# Patient Record
Sex: FEMALE | Race: WHITE
[De-identification: names, ages, dates, MRNs, and addresses within clinical notes are randomized per-mention and may not be internally consistent; named-entity substitution may affect disease eponyms.]

---

## 2024-04-11 ENCOUNTER — HOSPITAL ENCOUNTER
Dept: HOSPITAL 101 - RAD | Age: 54
Discharge: HOME | End: 2024-04-11
Payer: MEDICAID

## 2024-04-11 DIAGNOSIS — M48.061: Primary | ICD-10-CM

## 2024-04-11 DIAGNOSIS — M51.36: ICD-10-CM

## 2024-04-11 PROCEDURE — 72100 X-RAY EXAM L-S SPINE 2/3 VWS: CPT

## 2024-08-10 ENCOUNTER — APPOINTMENT (OUTPATIENT)
Dept: RADIOLOGY | Facility: HOSPITAL | Age: 54
End: 2024-08-10
Payer: MEDICAID

## 2024-08-10 ENCOUNTER — HOSPITAL ENCOUNTER (OUTPATIENT)
Facility: HOSPITAL | Age: 54
Setting detail: OBSERVATION
End: 2024-08-10
Attending: STUDENT IN AN ORGANIZED HEALTH CARE EDUCATION/TRAINING PROGRAM | Admitting: STUDENT IN AN ORGANIZED HEALTH CARE EDUCATION/TRAINING PROGRAM
Payer: MEDICAID

## 2024-08-10 ENCOUNTER — APPOINTMENT (OUTPATIENT)
Dept: CARDIOLOGY | Facility: HOSPITAL | Age: 54
End: 2024-08-10
Payer: MEDICAID

## 2024-08-10 DIAGNOSIS — I50.9 CONGESTIVE HEART FAILURE, UNSPECIFIED HF CHRONICITY, UNSPECIFIED HEART FAILURE TYPE: ICD-10-CM

## 2024-08-10 DIAGNOSIS — I25.118 CORONARY ARTERY DISEASE OF NATIVE ARTERY OF NATIVE HEART WITH STABLE ANGINA PECTORIS: ICD-10-CM

## 2024-08-10 DIAGNOSIS — I47.10 PAROXYSMAL SVT (SUPRAVENTRICULAR TACHYCARDIA) (CMS-HCC): ICD-10-CM

## 2024-08-10 DIAGNOSIS — E78.2 MIXED HYPERLIPIDEMIA: ICD-10-CM

## 2024-08-10 DIAGNOSIS — Z98.61 POST PTCA: ICD-10-CM

## 2024-08-10 DIAGNOSIS — I25.110 ATHEROSCLEROTIC HEART DISEASE OF NATIVE CORONARY ARTERY WITH UNSTABLE ANGINA PECTORIS: ICD-10-CM

## 2024-08-10 DIAGNOSIS — R07.9 CHEST PAIN, UNSPECIFIED TYPE: Primary | ICD-10-CM

## 2024-08-10 PROBLEM — M50.20 CERVICAL DISCOGENIC PAIN SYNDROME: Status: ACTIVE | Noted: 2024-08-10

## 2024-08-10 PROBLEM — M48.061 DEGENERATIVE LUMBAR SPINAL STENOSIS: Status: ACTIVE | Noted: 2024-08-10

## 2024-08-10 PROBLEM — M50.30 CERVICAL DISCOGENIC PAIN SYNDROME: Status: ACTIVE | Noted: 2024-08-10

## 2024-08-10 PROBLEM — J30.2 SEASONAL ALLERGIC RHINITIS: Status: ACTIVE | Noted: 2019-08-06

## 2024-08-10 PROBLEM — F41.9 ANXIETY: Status: ACTIVE | Noted: 2024-08-10

## 2024-08-10 PROBLEM — K21.9 GASTROESOPHAGEAL REFLUX DISEASE: Status: ACTIVE | Noted: 2024-08-10

## 2024-08-10 LAB
ALBUMIN SERPL BCP-MCNC: 3.7 G/DL (ref 3.4–5)
ALP SERPL-CCNC: 60 U/L (ref 33–110)
ALT SERPL W P-5'-P-CCNC: 10 U/L (ref 7–45)
ANION GAP SERPL CALC-SCNC: 12 MMOL/L (ref 10–20)
AST SERPL W P-5'-P-CCNC: 11 U/L (ref 9–39)
BASOPHILS # BLD AUTO: 0.04 X10*3/UL (ref 0–0.1)
BASOPHILS NFR BLD AUTO: 0.5 %
BILIRUB SERPL-MCNC: 0.3 MG/DL (ref 0–1.2)
BNP SERPL-MCNC: 20 PG/ML (ref 0–99)
BUN SERPL-MCNC: 12 MG/DL (ref 6–23)
CALCIUM SERPL-MCNC: 8.6 MG/DL (ref 8.6–10.3)
CARDIAC TROPONIN I PNL SERPL HS: 5 NG/L (ref 0–13)
CARDIAC TROPONIN I PNL SERPL HS: 6 NG/L (ref 0–13)
CHLORIDE SERPL-SCNC: 101 MMOL/L (ref 98–107)
CO2 SERPL-SCNC: 24 MMOL/L (ref 21–32)
CREAT SERPL-MCNC: 0.62 MG/DL (ref 0.5–1.05)
D DIMER PPP FEU-MCNC: 532 NG/ML FEU
EGFRCR SERPLBLD CKD-EPI 2021: >90 ML/MIN/1.73M*2
EOSINOPHIL # BLD AUTO: 0.19 X10*3/UL (ref 0–0.7)
EOSINOPHIL NFR BLD AUTO: 2.2 %
ERYTHROCYTE [DISTWIDTH] IN BLOOD BY AUTOMATED COUNT: 13.3 % (ref 11.5–14.5)
GLUCOSE SERPL-MCNC: 133 MG/DL (ref 74–99)
HCT VFR BLD AUTO: 34.6 % (ref 36–46)
HGB BLD-MCNC: 11.4 G/DL (ref 12–16)
IMM GRANULOCYTES # BLD AUTO: 0.02 X10*3/UL (ref 0–0.7)
IMM GRANULOCYTES NFR BLD AUTO: 0.2 % (ref 0–0.9)
LYMPHOCYTES # BLD AUTO: 3.48 X10*3/UL (ref 1.2–4.8)
LYMPHOCYTES NFR BLD AUTO: 39.7 %
MAGNESIUM SERPL-MCNC: 1.83 MG/DL (ref 1.6–2.4)
MCH RBC QN AUTO: 32 PG (ref 26–34)
MCHC RBC AUTO-ENTMCNC: 32.9 G/DL (ref 32–36)
MCV RBC AUTO: 97 FL (ref 80–100)
MONOCYTES # BLD AUTO: 0.37 X10*3/UL (ref 0.1–1)
MONOCYTES NFR BLD AUTO: 4.2 %
NEUTROPHILS # BLD AUTO: 4.67 X10*3/UL (ref 1.2–7.7)
NEUTROPHILS NFR BLD AUTO: 53.2 %
NRBC BLD-RTO: 0 /100 WBCS (ref 0–0)
PLATELET # BLD AUTO: 249 X10*3/UL (ref 150–450)
POTASSIUM SERPL-SCNC: 3.6 MMOL/L (ref 3.5–5.3)
PROT SERPL-MCNC: 6.4 G/DL (ref 6.4–8.2)
RBC # BLD AUTO: 3.56 X10*6/UL (ref 4–5.2)
SODIUM SERPL-SCNC: 133 MMOL/L (ref 136–145)
WBC # BLD AUTO: 8.8 X10*3/UL (ref 4.4–11.3)

## 2024-08-10 PROCEDURE — 96374 THER/PROPH/DIAG INJ IV PUSH: CPT

## 2024-08-10 PROCEDURE — 93005 ELECTROCARDIOGRAM TRACING: CPT

## 2024-08-10 PROCEDURE — 99223 1ST HOSP IP/OBS HIGH 75: CPT | Performed by: INTERNAL MEDICINE

## 2024-08-10 PROCEDURE — 84484 ASSAY OF TROPONIN QUANT: CPT | Performed by: NURSE PRACTITIONER

## 2024-08-10 PROCEDURE — 99285 EMERGENCY DEPT VISIT HI MDM: CPT | Mod: 25

## 2024-08-10 PROCEDURE — 2500000001 HC RX 250 WO HCPCS SELF ADMINISTERED DRUGS (ALT 637 FOR MEDICARE OP): Performed by: NURSE PRACTITIONER

## 2024-08-10 PROCEDURE — 83735 ASSAY OF MAGNESIUM: CPT | Performed by: NURSE PRACTITIONER

## 2024-08-10 PROCEDURE — 85379 FIBRIN DEGRADATION QUANT: CPT | Performed by: INTERNAL MEDICINE

## 2024-08-10 PROCEDURE — 2500000004 HC RX 250 GENERAL PHARMACY W/ HCPCS (ALT 636 FOR OP/ED): Performed by: INTERNAL MEDICINE

## 2024-08-10 PROCEDURE — 80053 COMPREHEN METABOLIC PANEL: CPT | Performed by: NURSE PRACTITIONER

## 2024-08-10 PROCEDURE — 2500000001 HC RX 250 WO HCPCS SELF ADMINISTERED DRUGS (ALT 637 FOR MEDICARE OP): Performed by: INTERNAL MEDICINE

## 2024-08-10 PROCEDURE — 71045 X-RAY EXAM CHEST 1 VIEW: CPT

## 2024-08-10 PROCEDURE — 83880 ASSAY OF NATRIURETIC PEPTIDE: CPT | Performed by: NURSE PRACTITIONER

## 2024-08-10 PROCEDURE — 36415 COLL VENOUS BLD VENIPUNCTURE: CPT | Performed by: NURSE PRACTITIONER

## 2024-08-10 PROCEDURE — G0378 HOSPITAL OBSERVATION PER HR: HCPCS

## 2024-08-10 PROCEDURE — 2500000004 HC RX 250 GENERAL PHARMACY W/ HCPCS (ALT 636 FOR OP/ED): Performed by: NURSE PRACTITIONER

## 2024-08-10 PROCEDURE — 83036 HEMOGLOBIN GLYCOSYLATED A1C: CPT | Mod: ELYLAB | Performed by: INTERNAL MEDICINE

## 2024-08-10 PROCEDURE — 36415 COLL VENOUS BLD VENIPUNCTURE: CPT | Performed by: INTERNAL MEDICINE

## 2024-08-10 PROCEDURE — 71045 X-RAY EXAM CHEST 1 VIEW: CPT | Performed by: RADIOLOGY

## 2024-08-10 PROCEDURE — 2500000005 HC RX 250 GENERAL PHARMACY W/O HCPCS: Performed by: INTERNAL MEDICINE

## 2024-08-10 PROCEDURE — 85025 COMPLETE CBC W/AUTO DIFF WBC: CPT | Performed by: NURSE PRACTITIONER

## 2024-08-10 RX ORDER — FENTANYL CITRATE 50 UG/ML
50 INJECTION, SOLUTION INTRAMUSCULAR; INTRAVENOUS ONCE
Status: COMPLETED | OUTPATIENT
Start: 2024-08-10 | End: 2024-08-10

## 2024-08-10 RX ORDER — ONDANSETRON HYDROCHLORIDE 2 MG/ML
4 INJECTION, SOLUTION INTRAVENOUS EVERY 8 HOURS PRN
Status: DISCONTINUED | OUTPATIENT
Start: 2024-08-10 | End: 2024-08-13 | Stop reason: HOSPADM

## 2024-08-10 RX ORDER — TALC
3 POWDER (GRAM) TOPICAL NIGHTLY PRN
Status: DISCONTINUED | OUTPATIENT
Start: 2024-08-10 | End: 2024-08-13 | Stop reason: HOSPADM

## 2024-08-10 RX ORDER — PANTOPRAZOLE SODIUM 40 MG/10ML
40 INJECTION, POWDER, LYOPHILIZED, FOR SOLUTION INTRAVENOUS
Status: DISCONTINUED | OUTPATIENT
Start: 2024-08-11 | End: 2024-08-12

## 2024-08-10 RX ORDER — ASPIRIN 81 MG/1
81 TABLET ORAL DAILY
COMMUNITY

## 2024-08-10 RX ORDER — METOPROLOL TARTRATE 25 MG/1
12.5 TABLET, FILM COATED ORAL 2 TIMES DAILY
Status: DISCONTINUED | OUTPATIENT
Start: 2024-08-10 | End: 2024-08-12

## 2024-08-10 RX ORDER — NITROGLYCERIN 0.4 MG/1
0.4 TABLET SUBLINGUAL EVERY 5 MIN PRN
Status: DISCONTINUED | OUTPATIENT
Start: 2024-08-10 | End: 2024-08-13 | Stop reason: HOSPADM

## 2024-08-10 RX ORDER — MORPHINE SULFATE 2 MG/ML
2 INJECTION, SOLUTION INTRAMUSCULAR; INTRAVENOUS
Status: DISCONTINUED | OUTPATIENT
Start: 2024-08-10 | End: 2024-08-13 | Stop reason: HOSPADM

## 2024-08-10 RX ORDER — LANOLIN ALCOHOL/MO/W.PET/CERES
400 CREAM (GRAM) TOPICAL 2 TIMES DAILY
Status: DISCONTINUED | OUTPATIENT
Start: 2024-08-10 | End: 2024-08-13 | Stop reason: HOSPADM

## 2024-08-10 RX ORDER — ACETAMINOPHEN 650 MG/1
650 SUPPOSITORY RECTAL EVERY 4 HOURS PRN
Status: DISCONTINUED | OUTPATIENT
Start: 2024-08-10 | End: 2024-08-13 | Stop reason: HOSPADM

## 2024-08-10 RX ORDER — SODIUM CHLORIDE, SODIUM LACTATE, POTASSIUM CHLORIDE, CALCIUM CHLORIDE 600; 310; 30; 20 MG/100ML; MG/100ML; MG/100ML; MG/100ML
75 INJECTION, SOLUTION INTRAVENOUS CONTINUOUS
Status: DISCONTINUED | OUTPATIENT
Start: 2024-08-10 | End: 2024-08-11

## 2024-08-10 RX ORDER — ACETAMINOPHEN 160 MG/5ML
650 SOLUTION ORAL EVERY 4 HOURS PRN
Status: DISCONTINUED | OUTPATIENT
Start: 2024-08-10 | End: 2024-08-13 | Stop reason: HOSPADM

## 2024-08-10 RX ORDER — POLYETHYLENE GLYCOL 3350 17 G/17G
17 POWDER, FOR SOLUTION ORAL DAILY PRN
Status: DISCONTINUED | OUTPATIENT
Start: 2024-08-10 | End: 2024-08-13 | Stop reason: HOSPADM

## 2024-08-10 RX ORDER — PANTOPRAZOLE SODIUM 40 MG/1
40 TABLET, DELAYED RELEASE ORAL
Status: DISCONTINUED | OUTPATIENT
Start: 2024-08-11 | End: 2024-08-12

## 2024-08-10 RX ORDER — NAPROXEN SODIUM 220 MG/1
243 TABLET, FILM COATED ORAL ONCE
Status: COMPLETED | OUTPATIENT
Start: 2024-08-10 | End: 2024-08-10

## 2024-08-10 RX ORDER — ATORVASTATIN CALCIUM 80 MG/1
80 TABLET, FILM COATED ORAL NIGHTLY
Status: DISCONTINUED | OUTPATIENT
Start: 2024-08-10 | End: 2024-08-13 | Stop reason: HOSPADM

## 2024-08-10 RX ORDER — ONDANSETRON 4 MG/1
4 TABLET, FILM COATED ORAL EVERY 8 HOURS PRN
Status: DISCONTINUED | OUTPATIENT
Start: 2024-08-10 | End: 2024-08-13 | Stop reason: HOSPADM

## 2024-08-10 RX ORDER — ACETAMINOPHEN 325 MG/1
650 TABLET ORAL EVERY 4 HOURS PRN
Status: DISCONTINUED | OUTPATIENT
Start: 2024-08-10 | End: 2024-08-13 | Stop reason: HOSPADM

## 2024-08-10 RX ORDER — ASPIRIN 81 MG/1
81 TABLET ORAL DAILY
Status: DISCONTINUED | OUTPATIENT
Start: 2024-08-11 | End: 2024-08-13 | Stop reason: HOSPADM

## 2024-08-10 RX ORDER — ATORVASTATIN CALCIUM 80 MG/1
80 TABLET, FILM COATED ORAL DAILY
COMMUNITY

## 2024-08-10 RX ORDER — NITROGLYCERIN 0.4 MG/1
0.4 TABLET SUBLINGUAL EVERY 5 MIN PRN
COMMUNITY

## 2024-08-10 RX ADMIN — METOPROLOL TARTRATE 12.5 MG: 25 TABLET, FILM COATED ORAL at 22:22

## 2024-08-10 RX ADMIN — NITROGLYCERIN 0.4 MG: 0.4 TABLET SUBLINGUAL at 21:00

## 2024-08-10 RX ADMIN — MAGNESIUM OXIDE 400 MG (241.3 MG MAGNESIUM) TABLET 400 MG: TABLET at 22:22

## 2024-08-10 RX ADMIN — MORPHINE SULFATE 2 MG: 2 INJECTION, SOLUTION INTRAMUSCULAR; INTRAVENOUS at 22:22

## 2024-08-10 RX ADMIN — FENTANYL CITRATE 50 MCG: 50 INJECTION, SOLUTION INTRAMUSCULAR; INTRAVENOUS at 18:01

## 2024-08-10 RX ADMIN — ATORVASTATIN CALCIUM 80 MG: 80 TABLET, FILM COATED ORAL at 22:22

## 2024-08-10 RX ADMIN — SODIUM CHLORIDE, POTASSIUM CHLORIDE, SODIUM LACTATE AND CALCIUM CHLORIDE 75 ML/HR: 600; 310; 30; 20 INJECTION, SOLUTION INTRAVENOUS at 20:57

## 2024-08-10 RX ADMIN — ONDANSETRON 4 MG: 4 TABLET, FILM COATED ORAL at 22:22

## 2024-08-10 RX ADMIN — ASPIRIN 243 MG: 81 TABLET, CHEWABLE ORAL at 16:28

## 2024-08-10 SDOH — SOCIAL STABILITY: SOCIAL INSECURITY: DO YOU FEEL ANYONE HAS EXPLOITED OR TAKEN ADVANTAGE OF YOU FINANCIALLY OR OF YOUR PERSONAL PROPERTY?: NO

## 2024-08-10 SDOH — SOCIAL STABILITY: SOCIAL INSECURITY: ABUSE: ADULT

## 2024-08-10 SDOH — SOCIAL STABILITY: SOCIAL INSECURITY: HAVE YOU HAD ANY THOUGHTS OF HARMING ANYONE ELSE?: NO

## 2024-08-10 SDOH — SOCIAL STABILITY: SOCIAL INSECURITY: DOES ANYONE TRY TO KEEP YOU FROM HAVING/CONTACTING OTHER FRIENDS OR DOING THINGS OUTSIDE YOUR HOME?: NO

## 2024-08-10 SDOH — SOCIAL STABILITY: SOCIAL INSECURITY: HAVE YOU HAD THOUGHTS OF HARMING ANYONE ELSE?: NO

## 2024-08-10 SDOH — SOCIAL STABILITY: SOCIAL INSECURITY: ARE YOU OR HAVE YOU BEEN THREATENED OR ABUSED PHYSICALLY, EMOTIONALLY, OR SEXUALLY BY ANYONE?: NO

## 2024-08-10 SDOH — SOCIAL STABILITY: SOCIAL INSECURITY: HAS ANYONE EVER THREATENED TO HURT YOUR FAMILY OR YOUR PETS?: NO

## 2024-08-10 SDOH — SOCIAL STABILITY: SOCIAL INSECURITY: ARE THERE ANY APPARENT SIGNS OF INJURIES/BEHAVIORS THAT COULD BE RELATED TO ABUSE/NEGLECT?: NO

## 2024-08-10 SDOH — SOCIAL STABILITY: SOCIAL INSECURITY: DO YOU FEEL UNSAFE GOING BACK TO THE PLACE WHERE YOU ARE LIVING?: NO

## 2024-08-10 SDOH — SOCIAL STABILITY: SOCIAL INSECURITY: WERE YOU ABLE TO COMPLETE ALL THE BEHAVIORAL HEALTH SCREENINGS?: YES

## 2024-08-10 ASSESSMENT — PATIENT HEALTH QUESTIONNAIRE - PHQ9
1. LITTLE INTEREST OR PLEASURE IN DOING THINGS: NOT AT ALL
SUM OF ALL RESPONSES TO PHQ9 QUESTIONS 1 & 2: 0
2. FEELING DOWN, DEPRESSED OR HOPELESS: NOT AT ALL

## 2024-08-10 ASSESSMENT — COGNITIVE AND FUNCTIONAL STATUS - GENERAL
PATIENT BASELINE BEDBOUND: NO
DAILY ACTIVITIY SCORE: 24
MOBILITY SCORE: 24

## 2024-08-10 ASSESSMENT — PAIN DESCRIPTION - PROGRESSION: CLINICAL_PROGRESSION: GRADUALLY IMPROVING

## 2024-08-10 ASSESSMENT — HEART SCORE
TROPONIN: LESS THAN OR EQUAL TO NORMAL LIMIT
HEART SCORE: 4
AGE: 45-64
ECG: NORMAL
HISTORY: MODERATELY SUSPICIOUS
RISK FACTORS: >2 RISK FACTORS OR HX OF ATHEROSCLEROTIC DISEASE

## 2024-08-10 ASSESSMENT — PAIN DESCRIPTION - LOCATION
LOCATION: CHEST

## 2024-08-10 ASSESSMENT — ACTIVITIES OF DAILY LIVING (ADL)
HEARING - RIGHT EAR: FUNCTIONAL
HEARING - LEFT EAR: FUNCTIONAL
DRESSING YOURSELF: INDEPENDENT
ADEQUATE_TO_COMPLETE_ADL: YES
TOILETING: INDEPENDENT
BATHING: INDEPENDENT
FEEDING YOURSELF: INDEPENDENT
JUDGMENT_ADEQUATE_SAFELY_COMPLETE_DAILY_ACTIVITIES: YES
LACK_OF_TRANSPORTATION: NO
PATIENT'S MEMORY ADEQUATE TO SAFELY COMPLETE DAILY ACTIVITIES?: YES
WALKS IN HOME: INDEPENDENT
GROOMING: INDEPENDENT

## 2024-08-10 ASSESSMENT — LIFESTYLE VARIABLES
TOTAL SCORE: 0
AUDIT-C TOTAL SCORE: 0
AUDIT-C TOTAL SCORE: 0
HAVE PEOPLE ANNOYED YOU BY CRITICIZING YOUR DRINKING: NO
HOW OFTEN DO YOU HAVE 6 OR MORE DRINKS ON ONE OCCASION: NEVER
EVER HAD A DRINK FIRST THING IN THE MORNING TO STEADY YOUR NERVES TO GET RID OF A HANGOVER: NO
HOW OFTEN DO YOU HAVE A DRINK CONTAINING ALCOHOL: NEVER
HOW MANY STANDARD DRINKS CONTAINING ALCOHOL DO YOU HAVE ON A TYPICAL DAY: PATIENT DOES NOT DRINK
HAVE YOU EVER FELT YOU SHOULD CUT DOWN ON YOUR DRINKING: NO
EVER FELT BAD OR GUILTY ABOUT YOUR DRINKING: NO
SKIP TO QUESTIONS 9-10: 1

## 2024-08-10 ASSESSMENT — PAIN SCALES - GENERAL
PAINLEVEL_OUTOF10: 8
PAINLEVEL_OUTOF10: 8
PAINLEVEL_OUTOF10: 4
PAINLEVEL_OUTOF10: 7

## 2024-08-10 ASSESSMENT — PAIN - FUNCTIONAL ASSESSMENT
PAIN_FUNCTIONAL_ASSESSMENT: 0-10
PAIN_FUNCTIONAL_ASSESSMENT: 0-10

## 2024-08-10 ASSESSMENT — COLUMBIA-SUICIDE SEVERITY RATING SCALE - C-SSRS
6. HAVE YOU EVER DONE ANYTHING, STARTED TO DO ANYTHING, OR PREPARED TO DO ANYTHING TO END YOUR LIFE?: NO
1. IN THE PAST MONTH, HAVE YOU WISHED YOU WERE DEAD OR WISHED YOU COULD GO TO SLEEP AND NOT WAKE UP?: NO
2. HAVE YOU ACTUALLY HAD ANY THOUGHTS OF KILLING YOURSELF?: NO

## 2024-08-10 ASSESSMENT — PAIN DESCRIPTION - PAIN TYPE: TYPE: ACUTE PAIN

## 2024-08-10 NOTE — Clinical Note
Angioplasty of the proximal left anterior descending lesion. Inflation 1: Pressure = 10 star; Duration = 17 sec.

## 2024-08-10 NOTE — Clinical Note
Angioplasty of the proximal left anterior descending lesion. Inflation 1: Pressure = 16 star; Duration = 30 sec.

## 2024-08-10 NOTE — H&P
History Of Present Illness  Fallon Stevens is a 54 y.o. female presenting with chest pain on and off for 1 week.  She described retrosternal chest pain, sharp and pressure, radiated to both shoulders and left jaw.  It happens at rest and with exertion.  She believes was worse than her prior MI episode when she had stent placed for 90% occlusion of one of the arteries in 2020.  She denied fever, nausea, vomiting or diarrhea.  She reported her blood pressure always runs low.  He has family history of CABG her mom.  Patient was initially admitted at another hospital [facility]'s was planned to have cardiac cath but because of the delay for 3 days, she decided to sign AMA.  She did have echo performed there.    ER course: Patient was awake, alert, oriented, no acute distress.  She was hemodynamically stable.  Her EKG shows sinus rhythm without significant ischemic changes.  Her labs shows negative troponin x 2, no leukocytosis, normal chemistry except for mild hyponatremia 133.  Chest x-ray reported negative for acute pathology.  Patient was given loading dose of aspirin in the ER plus fentanyl that helped improve her pain but did not resolve completely.  Patient is admitted to medicine for further workup.    Past Medical History  Coronary artery disease status post stent  Hyperlipidemia  Paroxysmal supraventricular tachycardia  Surgical History  PCI     Social History  Former smoker quit more than 10 years ago  Denies abnormal alcohol use or illicit drug use.    Family History  Mother had CABG     Allergies  Patient has no known allergies.    Review of Systems  10/10 points review of system were conducted, negative except as above.    Physical Exam  Constitutional:       Appearance: Normal appearance. She is not ill-appearing or diaphoretic.   HENT:      Head: Normocephalic and atraumatic.      Nose: Nose normal. No congestion.      Mouth/Throat:      Mouth: Mucous membranes are moist.   Eyes:      General: No  "scleral icterus.     Extraocular Movements: Extraocular movements intact.      Conjunctiva/sclera: Conjunctivae normal.      Pupils: Pupils are equal, round, and reactive to light.   Neck:      Vascular: No carotid bruit.   Cardiovascular:      Rate and Rhythm: Normal rate and regular rhythm.      Pulses: Normal pulses.      Heart sounds: No murmur heard.     No gallop.   Pulmonary:      Effort: No respiratory distress.      Breath sounds: No stridor. No wheezing, rhonchi or rales.   Chest:      Chest wall: No tenderness.   Abdominal:      General: There is no distension.      Palpations: There is no mass.      Tenderness: There is no abdominal tenderness. There is no right CVA tenderness, left CVA tenderness, guarding or rebound.      Hernia: No hernia is present.   Musculoskeletal:         General: No swelling, tenderness, deformity or signs of injury.      Cervical back: Normal range of motion and neck supple. No rigidity or tenderness.      Right lower leg: No edema.      Left lower leg: No edema.   Lymphadenopathy:      Cervical: No cervical adenopathy.   Skin:     General: Skin is warm and dry.      Coloration: Skin is not jaundiced or pale.      Findings: No bruising, erythema, lesion or rash.   Neurological:      General: No focal deficit present.      Mental Status: She is alert and oriented to person, place, and time. Mental status is at baseline.   Psychiatric:         Mood and Affect: Mood normal.         Behavior: Behavior normal.          Last Recorded Vitals  Blood pressure 106/67, pulse 88, temperature 36.1 °C (97 °F), temperature source Temporal, resp. rate 16, height 1.676 m (5' 6\"), weight 77.1 kg (170 lb), SpO2 94%.    Relevant Results  Scheduled medications  [START ON 8/11/2024] aspirin, 81 mg, oral, Daily  atorvastatin, 80 mg, oral, Nightly  magnesium oxide, 400 mg, oral, BID  metoprolol tartrate, 12.5 mg, oral, BID  [START ON 8/11/2024] pantoprazole, 40 mg, oral, Daily before breakfast   " Or  [START ON 8/11/2024] pantoprazole, 40 mg, intravenous, Daily before breakfast      Continuous medications  lactated Ringer's, 75 mL/hr, Last Rate: 75 mL/hr (08/10/24 2057)      PRN medications  PRN medications: acetaminophen **OR** acetaminophen **OR** acetaminophen, melatonin, morphine, nitroglycerin, ondansetron **OR** ondansetron, polyethylene glycol      Results for orders placed or performed during the hospital encounter of 08/10/24 (from the past 24 hour(s))   B-Type Natriuretic Peptide   Result Value Ref Range    BNP 20 0 - 99 pg/mL   Magnesium   Result Value Ref Range    Magnesium 1.83 1.60 - 2.40 mg/dL   Comprehensive Metabolic Panel   Result Value Ref Range    Glucose 133 (H) 74 - 99 mg/dL    Sodium 133 (L) 136 - 145 mmol/L    Potassium 3.6 3.5 - 5.3 mmol/L    Chloride 101 98 - 107 mmol/L    Bicarbonate 24 21 - 32 mmol/L    Anion Gap 12 10 - 20 mmol/L    Urea Nitrogen 12 6 - 23 mg/dL    Creatinine 0.62 0.50 - 1.05 mg/dL    eGFR >90 >60 mL/min/1.73m*2    Calcium 8.6 8.6 - 10.3 mg/dL    Albumin 3.7 3.4 - 5.0 g/dL    Alkaline Phosphatase 60 33 - 110 U/L    Total Protein 6.4 6.4 - 8.2 g/dL    AST 11 9 - 39 U/L    Bilirubin, Total 0.3 0.0 - 1.2 mg/dL    ALT 10 7 - 45 U/L   CBC and Auto Differential   Result Value Ref Range    WBC 8.8 4.4 - 11.3 x10*3/uL    nRBC 0.0 0.0 - 0.0 /100 WBCs    RBC 3.56 (L) 4.00 - 5.20 x10*6/uL    Hemoglobin 11.4 (L) 12.0 - 16.0 g/dL    Hematocrit 34.6 (L) 36.0 - 46.0 %    MCV 97 80 - 100 fL    MCH 32.0 26.0 - 34.0 pg    MCHC 32.9 32.0 - 36.0 g/dL    RDW 13.3 11.5 - 14.5 %    Platelets 249 150 - 450 x10*3/uL    Neutrophils % 53.2 40.0 - 80.0 %    Immature Granulocytes %, Automated 0.2 0.0 - 0.9 %    Lymphocytes % 39.7 13.0 - 44.0 %    Monocytes % 4.2 2.0 - 10.0 %    Eosinophils % 2.2 0.0 - 6.0 %    Basophils % 0.5 0.0 - 2.0 %    Neutrophils Absolute 4.67 1.20 - 7.70 x10*3/uL    Immature Granulocytes Absolute, Automated 0.02 0.00 - 0.70 x10*3/uL    Lymphocytes Absolute 3.48 1.20 -  4.80 x10*3/uL    Monocytes Absolute 0.37 0.10 - 1.00 x10*3/uL    Eosinophils Absolute 0.19 0.00 - 0.70 x10*3/uL    Basophils Absolute 0.04 0.00 - 0.10 x10*3/uL   Troponin I, High Sensitivity, Initial   Result Value Ref Range    Troponin I, High Sensitivity 5 0 - 13 ng/L   ECG 12 Lead   Result Value Ref Range    Ventricular Rate 72 BPM    Atrial Rate 72 BPM    OK Interval 164 ms    QRS Duration 78 ms    QT Interval 418 ms    QTC Calculation(Bazett) 457 ms    P Axis 27 degrees    R Axis -6 degrees    T Axis 44 degrees    QRS Count 12 beats    Q Onset 220 ms    P Onset 138 ms    P Offset 183 ms    T Offset 429 ms    QTC Fredericia 444 ms   Troponin, High Sensitivity, 1 Hour   Result Value Ref Range    Troponin I, High Sensitivity 6 0 - 13 ng/L       ECG 12 Lead    Result Date: 8/10/2024  Normal sinus rhythm Normal ECG No previous ECGs available    XR chest 1 view    Result Date: 8/10/2024  Interpreted By:  Eunice Bean, STUDY: XR CHEST 1 VIEW;  8/10/2024 4:26 pm   INDICATION: Signs/Symptoms:Chest Pain.   COMPARISON: None.   ACCESSION NUMBER(S): [id number]   ORDERING CLINICIAN: KAITLYNN SEVILLA   FINDINGS: CARDIOMEDIASTINAL SILHOUETTE: Cardiomediastinal silhouette is normal in size and configuration.     LUNGS: Lungs are clear.   ABDOMEN: No remarkable upper abdominal findings.     BONES: No acute osseous changes. Status post ventral fusion lower cervical spine.       No acute cardiopulmonary process.   MACRO: None   Signed by: Eunice Bean 8/10/2024 4:49 PM Dictation workstation:   [id number]       Assessment/Plan   Principal Problem:    Chest pain, unspecified  Active Problems:    Mixed hyperlipidemia    Paroxysmal SVT (supraventricular tachycardia) (CMS-HCC)    Coronary artery disease of native artery of native heart with stable angina pectoris (CMS-HCC)    Gastroesophageal reflux disease    Patient presented with chest pain likely stable angina.  She has history of coronary artery disease and uses aspirin and  as needed nitroglycerin.  -Trend another troponin.  -Check D-dimer  -She had echocardiogram obtained at another facility will request her record.  -Sublingual nitroglycerin.  Optimize pain control  -Cardiology consult.  -Keep n.p.o. after midnight for possible cardiac cath for persistent pain  -Monitor hemodynamic status.  -Monitor serum electrolytes and correct as indicated.  Will start replacing magnesium.  -Check hemoglobin A1c.  She has hyperglycemia no history of diabetes.  -She has history of paroxysmal ventricular tachycardia currently on low-dose metoprolol.  Will resume that.  -Also history of chronic GERD.  Will start pantoprazole.      Pascual Wilcox MD

## 2024-08-11 ENCOUNTER — APPOINTMENT (OUTPATIENT)
Dept: CARDIOLOGY | Facility: HOSPITAL | Age: 54
End: 2024-08-11
Payer: MEDICAID

## 2024-08-11 VITALS
HEART RATE: 81 BPM | SYSTOLIC BLOOD PRESSURE: 118 MMHG | HEIGHT: 66 IN | RESPIRATION RATE: 16 BRPM | WEIGHT: 170 LBS | OXYGEN SATURATION: 97 % | BODY MASS INDEX: 27.32 KG/M2 | TEMPERATURE: 98.1 F | DIASTOLIC BLOOD PRESSURE: 60 MMHG

## 2024-08-11 LAB
ALBUMIN SERPL BCP-MCNC: 3.7 G/DL (ref 3.4–5)
ALP SERPL-CCNC: 61 U/L (ref 33–110)
ALT SERPL W P-5'-P-CCNC: 9 U/L (ref 7–45)
ANION GAP SERPL CALC-SCNC: 10 MMOL/L (ref 10–20)
AST SERPL W P-5'-P-CCNC: 10 U/L (ref 9–39)
ATRIAL RATE: 65 BPM
ATRIAL RATE: 72 BPM
BILIRUB SERPL-MCNC: 0.3 MG/DL (ref 0–1.2)
BUN SERPL-MCNC: 8 MG/DL (ref 6–23)
CALCIUM SERPL-MCNC: 8.9 MG/DL (ref 8.6–10.3)
CARDIAC TROPONIN I PNL SERPL HS: 5 NG/L (ref 0–13)
CHLORIDE SERPL-SCNC: 106 MMOL/L (ref 98–107)
CO2 SERPL-SCNC: 27 MMOL/L (ref 21–32)
CREAT SERPL-MCNC: 0.63 MG/DL (ref 0.5–1.05)
EGFRCR SERPLBLD CKD-EPI 2021: >90 ML/MIN/1.73M*2
ERYTHROCYTE [DISTWIDTH] IN BLOOD BY AUTOMATED COUNT: 13.5 % (ref 11.5–14.5)
EST. AVERAGE GLUCOSE BLD GHB EST-MCNC: 117 MG/DL
GLUCOSE SERPL-MCNC: 89 MG/DL (ref 74–99)
HBA1C MFR BLD: 5.7 %
HCT VFR BLD AUTO: 36.4 % (ref 36–46)
HGB BLD-MCNC: 12 G/DL (ref 12–16)
HOLD SPECIMEN: NORMAL
MCH RBC QN AUTO: 32.1 PG (ref 26–34)
MCHC RBC AUTO-ENTMCNC: 33 G/DL (ref 32–36)
MCV RBC AUTO: 97 FL (ref 80–100)
NRBC BLD-RTO: 0 /100 WBCS (ref 0–0)
P AXIS: 19 DEGREES
P AXIS: 27 DEGREES
P OFFSET: 183 MS
P OFFSET: 185 MS
P ONSET: 135 MS
P ONSET: 138 MS
PLATELET # BLD AUTO: 255 X10*3/UL (ref 150–450)
POTASSIUM SERPL-SCNC: 4.2 MMOL/L (ref 3.5–5.3)
PR INTERVAL: 164 MS
PR INTERVAL: 170 MS
PROT SERPL-MCNC: 6.1 G/DL (ref 6.4–8.2)
Q ONSET: 220 MS
Q ONSET: 220 MS
QRS COUNT: 11 BEATS
QRS COUNT: 12 BEATS
QRS DURATION: 78 MS
QRS DURATION: 78 MS
QT INTERVAL: 418 MS
QT INTERVAL: 424 MS
QTC CALCULATION(BAZETT): 440 MS
QTC CALCULATION(BAZETT): 457 MS
QTC FREDERICIA: 435 MS
QTC FREDERICIA: 444 MS
R AXIS: -2 DEGREES
R AXIS: -6 DEGREES
RBC # BLD AUTO: 3.74 X10*6/UL (ref 4–5.2)
SODIUM SERPL-SCNC: 139 MMOL/L (ref 136–145)
T AXIS: 44 DEGREES
T AXIS: 51 DEGREES
T OFFSET: 429 MS
T OFFSET: 432 MS
VENTRICULAR RATE: 65 BPM
VENTRICULAR RATE: 72 BPM
WBC # BLD AUTO: 7.9 X10*3/UL (ref 4.4–11.3)

## 2024-08-11 PROCEDURE — 99255 IP/OBS CONSLTJ NEW/EST HI 80: CPT | Performed by: INTERNAL MEDICINE

## 2024-08-11 PROCEDURE — 2500000004 HC RX 250 GENERAL PHARMACY W/ HCPCS (ALT 636 FOR OP/ED): Performed by: INTERNAL MEDICINE

## 2024-08-11 PROCEDURE — 2500000001 HC RX 250 WO HCPCS SELF ADMINISTERED DRUGS (ALT 637 FOR MEDICARE OP): Performed by: INTERNAL MEDICINE

## 2024-08-11 PROCEDURE — 99232 SBSQ HOSP IP/OBS MODERATE 35: CPT | Performed by: INTERNAL MEDICINE

## 2024-08-11 PROCEDURE — 84075 ASSAY ALKALINE PHOSPHATASE: CPT | Performed by: INTERNAL MEDICINE

## 2024-08-11 PROCEDURE — 93010 ELECTROCARDIOGRAM REPORT: CPT | Performed by: INTERNAL MEDICINE

## 2024-08-11 PROCEDURE — 93005 ELECTROCARDIOGRAM TRACING: CPT

## 2024-08-11 PROCEDURE — 84484 ASSAY OF TROPONIN QUANT: CPT | Performed by: INTERNAL MEDICINE

## 2024-08-11 PROCEDURE — 85027 COMPLETE CBC AUTOMATED: CPT | Performed by: INTERNAL MEDICINE

## 2024-08-11 PROCEDURE — G0378 HOSPITAL OBSERVATION PER HR: HCPCS

## 2024-08-11 PROCEDURE — 36415 COLL VENOUS BLD VENIPUNCTURE: CPT | Performed by: INTERNAL MEDICINE

## 2024-08-11 RX ADMIN — MORPHINE SULFATE 2 MG: 2 INJECTION, SOLUTION INTRAMUSCULAR; INTRAVENOUS at 18:16

## 2024-08-11 RX ADMIN — ATORVASTATIN CALCIUM 80 MG: 80 TABLET, FILM COATED ORAL at 20:36

## 2024-08-11 RX ADMIN — METOPROLOL TARTRATE 12.5 MG: 25 TABLET, FILM COATED ORAL at 20:37

## 2024-08-11 RX ADMIN — MORPHINE SULFATE 2 MG: 2 INJECTION, SOLUTION INTRAMUSCULAR; INTRAVENOUS at 14:08

## 2024-08-11 RX ADMIN — MORPHINE SULFATE 2 MG: 2 INJECTION, SOLUTION INTRAMUSCULAR; INTRAVENOUS at 21:19

## 2024-08-11 RX ADMIN — PANTOPRAZOLE SODIUM 40 MG: 40 TABLET, DELAYED RELEASE ORAL at 06:01

## 2024-08-11 RX ADMIN — MAGNESIUM OXIDE 400 MG (241.3 MG MAGNESIUM) TABLET 400 MG: TABLET at 20:36

## 2024-08-11 RX ADMIN — ASPIRIN 81 MG: 81 TABLET, COATED ORAL at 08:56

## 2024-08-11 RX ADMIN — MAGNESIUM OXIDE 400 MG (241.3 MG MAGNESIUM) TABLET 400 MG: TABLET at 08:56

## 2024-08-11 RX ADMIN — MORPHINE SULFATE 2 MG: 2 INJECTION, SOLUTION INTRAMUSCULAR; INTRAVENOUS at 08:57

## 2024-08-11 RX ADMIN — METOPROLOL TARTRATE 12.5 MG: 25 TABLET, FILM COATED ORAL at 08:56

## 2024-08-11 ASSESSMENT — COGNITIVE AND FUNCTIONAL STATUS - GENERAL
MOBILITY SCORE: 24
DAILY ACTIVITIY SCORE: 24
MOBILITY SCORE: 24
DAILY ACTIVITIY SCORE: 24

## 2024-08-11 ASSESSMENT — PAIN DESCRIPTION - LOCATION
LOCATION: CHEST

## 2024-08-11 ASSESSMENT — PAIN SCALES - GENERAL
PAINLEVEL_OUTOF10: 2
PAINLEVEL_OUTOF10: 2
PAINLEVEL_OUTOF10: 8
PAINLEVEL_OUTOF10: 2
PAINLEVEL_OUTOF10: 8
PAINLEVEL_OUTOF10: 9
PAINLEVEL_OUTOF10: 7

## 2024-08-11 ASSESSMENT — PAIN - FUNCTIONAL ASSESSMENT
PAIN_FUNCTIONAL_ASSESSMENT: 0-10

## 2024-08-11 NOTE — CONSULTS
Cardiology Consult Note      Date:   8/11/2024  Patient name:  Fallon Stevens  Date of admission:  8/10/2024  4:08 PM  MRN:   [id number]  YOB: 1970  Time of Consult:  8:53 AM    Consulting Cardiologist: Dr. Tommy Carpenter      Primary Cardiologist:    Referring Provider: Dr. Frank      Admission Diagnosis:     Chest pain, unspecified      History of Present Illness:      Fallon Stevens is a 54 y.o.  female patient who is being at the request of Dr. Dr. Frank for inpatient consultation of coronary disease, unstable angina, remote stent.  She was admitted on 8/10/2024.  Previous [facility] and [facility] records have been reviewed in detail.      This a 54-year-old female with a coronary disease history.  This dates back to 2018 where she underwent emergency PCI and stenting at [facility].  She cannot recollect the name of the physicians that took care of her.  She then had a repeat episode of unstable angina and was transferred to [place] in 2020 due to bed problems and COVID.  At that time she had another procedure but again she does not know the details.  She has not been following locally with cardiologist.  She was at [facility] over the last several days after developing chest pain 1 week ago.  She was awaiting cath there but the procedure is Being delayed so she left AMA and came here since she had many family members had been at this institution before.  Apparently labs EKGs and echo at the other institution were not substantially abnormal but I do not have all those data.  Currently the patient's EKG is sinus rhythm without any major ischemic changes.  She has had normal troponins.  She has had little recurrent chest pain.  She is on appropriate medications.  Plans will be to catheter tomorrow.  Allergies:     No Known Allergies      Past Medical History:   Spinal surgery  Anxiety  Hyperlipidemia  PSVT  Prior PCI and stenting  Former smoker      Past Surgical History:      Cervical spine surgery    Family History:     Noncontributory    Social History:     Social History     Tobacco Use    Smoking status: Former     Current packs/day: 0.00     Types: Cigarettes     Quit date:      Years since quittin.6    Smokeless tobacco: Never       CURRENT MEDICATIONS    aspirin, 81 mg, oral, Daily  atorvastatin, 80 mg, oral, Nightly  magnesium oxide, 400 mg, oral, BID  metoprolol tartrate, 12.5 mg, oral, BID  pantoprazole, 40 mg, oral, Daily before breakfast   Or  pantoprazole, 40 mg, intravenous, Daily before breakfast         Current Outpatient Medications   Medication Instructions    aspirin 81 mg, oral, Daily    atorvastatin (LIPITOR) 80 mg, oral, Daily    nitroglycerin (NITROSTAT) 0.4 mg, sublingual, Every 5 min PRN        Review of Systems:      12 point review of systems was obtained in detail and is negative other than that detailed above.    Vital Signs:     Vitals:    08/10/24 2017 24 0010 24 0011 24 0444   BP: 106/67 86/56 91/57 107/57   BP Location: Left arm Right arm Right arm Right arm   Patient Position: Lying Lying Lying Lying   Pulse: 88 61 59 59   Resp: 16 16 16 16   Temp: 36.1 °C (97 °F) 35.6 °C (96.1 °F)  36 °C (96.8 °F)   TempSrc: Temporal Temporal  Temporal   SpO2: 94% 96% 95% 97%   Weight:       Height:         No intake or output data in the 24 hours ending 24 0853    Wt Readings from Last 4 Encounters:   08/10/24 77.1 kg (170 lb)       Physical Examination:     GENERAL APPEARANCE: Well developed, well nourished, in no acute distress.  CHEST: Symmetric and non-tender.  INTEGUMENT: Skin warm and dry, without gross excoriationis or lesions.  HEENT: No gross abnormalities of conjunctiva, teeth, gums, oral mucosa  NECK: Supple, no JVD, no bruit. Thyroid not palpable. Carotid upstrokes normal.  NEURO/PSHCY: Alert and oriented x3; appropriate behavior and responses and responses, grossly normal cerebellar function with normal balance and  "coordination  LUNGS: Clear to auscultation bilaterally; normal respiratory effort.  HEART: Rate and rhythm regular with no evident murmur; no gallop appreciated. There are no rubs, clicks or heaves. PMI nondisplaced.  ABDOMEN: Soft, nontender, no palpable hepatosplenomegaly, no mases, no bruits. Abdominal aorta not noted to be enlarged.  MUSCULOSKELETAL: Ambulatory with normal tandem gait.  EXTREMITIES: Warm with good color, no clubbing or cyanois. There is no edema noted.  PERIPHERAL VASCULAR: Pulses present and equally palpable; 2+ throughout. No femoral bruits.      Lab:     CBC:   Lab Results   Component Value Date    WBC 7.9 08/11/2024    RBC 3.74 (L) 08/11/2024    HGB 12.0 08/11/2024    HCT 36.4 08/11/2024     08/11/2024        CMP:    Lab Results   Component Value Date     08/11/2024    K 4.2 08/11/2024     08/11/2024    CO2 27 08/11/2024    BUN 8 08/11/2024    CREATININE 0.63 08/11/2024    GLUCOSE 89 08/11/2024    CALCIUM 8.9 08/11/2024       Magnesium:    Lab Results   Component Value Date    MG 1.83 08/10/2024       Lipid Profile:    No results found for: \"CHLPL\", \"TRIG\", \"HDL\", \"LDLCALC\", \"LDLDIRECT\"    TSH:    No results found for: \"TSH\"    BNP:   Lab Results   Component Value Date    BNP 20 08/10/2024        PT/INR:    No results found for: \"PROTIME\", \"INR\"    HgBA1c:    No results found for: \"HGBA1C\"    BMP:  Lab Results   Component Value Date     08/11/2024     (L) 08/10/2024    K 4.2 08/11/2024    K 3.6 08/10/2024     08/11/2024     08/10/2024    CO2 27 08/11/2024    CO2 24 08/10/2024    BUN 8 08/11/2024    BUN 12 08/10/2024    CREATININE 0.63 08/11/2024    CREATININE 0.62 08/10/2024       CBC:  Lab Results   Component Value Date    WBC 7.9 08/11/2024    WBC 8.8 08/10/2024    RBC 3.74 (L) 08/11/2024    RBC 3.56 (L) 08/10/2024    HGB 12.0 08/11/2024    HGB 11.4 (L) 08/10/2024    HCT 36.4 08/11/2024    HCT 34.6 (L) 08/10/2024    MCV 97 08/11/2024    MCV 97 " 08/10/2024    MCH 32.1 08/11/2024    MCH 32.0 08/10/2024    MCHC 33.0 08/11/2024    MCHC 32.9 08/10/2024    RDW 13.5 08/11/2024    RDW 13.3 08/10/2024     08/11/2024     08/10/2024       Cardiac Enzymes:    Lab Results   Component Value Date    TROPHS 5 08/11/2024    TROPHS 6 08/10/2024    TROPHS 5 08/10/2024       Hepatic Function Panel:    Lab Results   Component Value Date    ALKPHOS 61 08/11/2024    ALT 9 08/11/2024    AST 10 08/11/2024    PROT 6.1 (L) 08/11/2024    BILITOT 0.3 08/11/2024       Diagnostic Studies:     ECG 12 Lead    Result Date: 8/10/2024  Normal sinus rhythm Normal ECG No previous ECGs available    XR chest 1 view    Result Date: 8/10/2024  Interpreted By:  Eunice Bean, STUDY: XR CHEST 1 VIEW;  8/10/2024 4:26 pm   INDICATION: Signs/Symptoms:Chest Pain.   COMPARISON: None.   ACCESSION NUMBER(S): [id number]   ORDERING CLINICIAN: KAITLYNN SEVILLA   FINDINGS: CARDIOMEDIASTINAL SILHOUETTE: Cardiomediastinal silhouette is normal in size and configuration.     LUNGS: Lungs are clear.   ABDOMEN: No remarkable upper abdominal findings.     BONES: No acute osseous changes. Status post ventral fusion lower cervical spine.       No acute cardiopulmonary process.   MACRO: None   Signed by: Eunice Bean 8/10/2024 4:49 PM Dictation workstation:   [id number]      Radiology:     XR chest 1 view   Final Result   No acute cardiopulmonary process.        MACRO:   None        Signed by: Eunice Bean 8/10/2024 4:49 PM   Dictation workstation:   [id number]          Problem List:     Patient Active Problem List   Diagnosis    Chest pain, unspecified    Mixed hyperlipidemia    Paroxysmal SVT (supraventricular tachycardia) (CMS-HCC)    Coronary artery disease of native artery of native heart with stable angina pectoris (CMS-HCC)    Anxiety    Congestive heart failure (Multi)    Degenerative lumbar spinal stenosis    Cervical discogenic pain syndrome    Gastroesophageal reflux disease    Seasonal  allergic rhinitis       Assessment:         54-year-old female seen and evaluated at the bedside in the telemetry unit.    Bedside examination evaluation and interview were performed by me.    Chart reviewed in detail discussed the patient at length.    Impression:  ASHD class II  Unstable angina  Known coronary disease with prior PCI and stenting on 2 occasions presumably LAD but details not readily available from other institution  GERD  Hyperlipidemia  Anxiety depression  Cervical spine disease and prior surgery    Plan:     Recommendation:  Patient will continue telemetry observation  Patient will be on aggressive medical management for the time being with catheterization planned for tomorrow  Disposition will follow catheterization  General Medicine will address other medical matters  Should the patient develop any unstable stable issues can be moved into the intensive care or consider for an emergency procedure if necessary.    Thank you for the opportunity to participate in the care of your patient.  Please do not hesitate to call if you have any questions.    Electronically signed by Tommy Carpenter DO, on 8/11/2024 at 8:53 AM

## 2024-08-11 NOTE — H&P (VIEW-ONLY)
Cardiology Consult Note      Date:   8/11/2024  Patient name:  Fallon Stevens  Date of admission:  8/10/2024  4:08 PM  MRN:   [id number]  YOB: 1970  Time of Consult:  8:53 AM    Consulting Cardiologist: Dr. Tommy Carpenter      Primary Cardiologist:    Referring Provider: Dr. Frank      Admission Diagnosis:     Chest pain, unspecified      History of Present Illness:      Fallon Stevens is a 54 y.o.  female patient who is being at the request of Dr. Dr. Frank for inpatient consultation of coronary disease, unstable angina, remote stent.  She was admitted on 8/10/2024.  Previous [facility] and [facility] records have been reviewed in detail.      This a 54-year-old female with a coronary disease history.  This dates back to 2018 where she underwent emergency PCI and stenting at [facility].  She cannot recollect the name of the physicians that took care of her.  She then had a repeat episode of unstable angina and was transferred to [facility] in 2020 due to bed problems and COVID.  At that time she had another procedure but again she does not know the details.  She has not been following locally with cardiologist.  She was at [facility] over the last several days after developing chest pain 1 week ago.  She was awaiting cath there but the procedure is Being delayed so she left AMA and came here since she had many family members had been at this institution before.  Apparently labs EKGs and echo at the other institution were not substantially abnormal but I do not have all those data.  Currently the patient's EKG is sinus rhythm without any major ischemic changes.  She has had normal troponins.  She has had little recurrent chest pain.  She is on appropriate medications.  Plans will be to catheter tomorrow.  Allergies:     No Known Allergies      Past Medical History:   Spinal surgery  Anxiety  Hyperlipidemia  PSVT  Prior PCI and stenting  Former smoker      Past Surgical History:      Cervical spine surgery    Family History:     Noncontributory    Social History:     Social History     Tobacco Use    Smoking status: Former     Current packs/day: 0.00     Types: Cigarettes     Quit date:      Years since quittin.6    Smokeless tobacco: Never       CURRENT MEDICATIONS    aspirin, 81 mg, oral, Daily  atorvastatin, 80 mg, oral, Nightly  magnesium oxide, 400 mg, oral, BID  metoprolol tartrate, 12.5 mg, oral, BID  pantoprazole, 40 mg, oral, Daily before breakfast   Or  pantoprazole, 40 mg, intravenous, Daily before breakfast         Current Outpatient Medications   Medication Instructions    aspirin 81 mg, oral, Daily    atorvastatin (LIPITOR) 80 mg, oral, Daily    nitroglycerin (NITROSTAT) 0.4 mg, sublingual, Every 5 min PRN        Review of Systems:      12 point review of systems was obtained in detail and is negative other than that detailed above.    Vital Signs:     Vitals:    08/10/24 2017 24 0010 24 0011 24 0444   BP: 106/67 86/56 91/57 107/57   BP Location: Left arm Right arm Right arm Right arm   Patient Position: Lying Lying Lying Lying   Pulse: 88 61 59 59   Resp: 16 16 16 16   Temp: 36.1 °C (97 °F) 35.6 °C (96.1 °F)  36 °C (96.8 °F)   TempSrc: Temporal Temporal  Temporal   SpO2: 94% 96% 95% 97%   Weight:       Height:         No intake or output data in the 24 hours ending 24 0853    Wt Readings from Last 4 Encounters:   08/10/24 77.1 kg (170 lb)       Physical Examination:     GENERAL APPEARANCE: Well developed, well nourished, in no acute distress.  CHEST: Symmetric and non-tender.  INTEGUMENT: Skin warm and dry, without gross excoriationis or lesions.  HEENT: No gross abnormalities of conjunctiva, teeth, gums, oral mucosa  NECK: Supple, no JVD, no bruit. Thyroid not palpable. Carotid upstrokes normal.  NEURO/PSHCY: Alert and oriented x3; appropriate behavior and responses and responses, grossly normal cerebellar function with normal balance and  "coordination  LUNGS: Clear to auscultation bilaterally; normal respiratory effort.  HEART: Rate and rhythm regular with no evident murmur; no gallop appreciated. There are no rubs, clicks or heaves. PMI nondisplaced.  ABDOMEN: Soft, nontender, no palpable hepatosplenomegaly, no mases, no bruits. Abdominal aorta not noted to be enlarged.  MUSCULOSKELETAL: Ambulatory with normal tandem gait.  EXTREMITIES: Warm with good color, no clubbing or cyanois. There is no edema noted.  PERIPHERAL VASCULAR: Pulses present and equally palpable; 2+ throughout. No femoral bruits.      Lab:     CBC:   Lab Results   Component Value Date    WBC 7.9 08/11/2024    RBC 3.74 (L) 08/11/2024    HGB 12.0 08/11/2024    HCT 36.4 08/11/2024     08/11/2024        CMP:    Lab Results   Component Value Date     08/11/2024    K 4.2 08/11/2024     08/11/2024    CO2 27 08/11/2024    BUN 8 08/11/2024    CREATININE 0.63 08/11/2024    GLUCOSE 89 08/11/2024    CALCIUM 8.9 08/11/2024       Magnesium:    Lab Results   Component Value Date    MG 1.83 08/10/2024       Lipid Profile:    No results found for: \"CHLPL\", \"TRIG\", \"HDL\", \"LDLCALC\", \"LDLDIRECT\"    TSH:    No results found for: \"TSH\"    BNP:   Lab Results   Component Value Date    BNP 20 08/10/2024        PT/INR:    No results found for: \"PROTIME\", \"INR\"    HgBA1c:    No results found for: \"HGBA1C\"    BMP:  Lab Results   Component Value Date     08/11/2024     (L) 08/10/2024    K 4.2 08/11/2024    K 3.6 08/10/2024     08/11/2024     08/10/2024    CO2 27 08/11/2024    CO2 24 08/10/2024    BUN 8 08/11/2024    BUN 12 08/10/2024    CREATININE 0.63 08/11/2024    CREATININE 0.62 08/10/2024       CBC:  Lab Results   Component Value Date    WBC 7.9 08/11/2024    WBC 8.8 08/10/2024    RBC 3.74 (L) 08/11/2024    RBC 3.56 (L) 08/10/2024    HGB 12.0 08/11/2024    HGB 11.4 (L) 08/10/2024    HCT 36.4 08/11/2024    HCT 34.6 (L) 08/10/2024    MCV 97 08/11/2024    MCV 97 " 08/10/2024    MCH 32.1 08/11/2024    MCH 32.0 08/10/2024    MCHC 33.0 08/11/2024    MCHC 32.9 08/10/2024    RDW 13.5 08/11/2024    RDW 13.3 08/10/2024     08/11/2024     08/10/2024       Cardiac Enzymes:    Lab Results   Component Value Date    TROPHS 5 08/11/2024    TROPHS 6 08/10/2024    TROPHS 5 08/10/2024       Hepatic Function Panel:    Lab Results   Component Value Date    ALKPHOS 61 08/11/2024    ALT 9 08/11/2024    AST 10 08/11/2024    PROT 6.1 (L) 08/11/2024    BILITOT 0.3 08/11/2024       Diagnostic Studies:     ECG 12 Lead    Result Date: 8/10/2024  Normal sinus rhythm Normal ECG No previous ECGs available    XR chest 1 view    Result Date: 8/10/2024  Interpreted By:  Eunice Bean, STUDY: XR CHEST 1 VIEW;  8/10/2024 4:26 pm   INDICATION: Signs/Symptoms:Chest Pain.   COMPARISON: None.   ACCESSION NUMBER(S): [id number]   ORDERING CLINICIAN: KAITLYNN SEVILLA   FINDINGS: CARDIOMEDIASTINAL SILHOUETTE: Cardiomediastinal silhouette is normal in size and configuration.     LUNGS: Lungs are clear.   ABDOMEN: No remarkable upper abdominal findings.     BONES: No acute osseous changes. Status post ventral fusion lower cervical spine.       No acute cardiopulmonary process.   MACRO: None   Signed by: Eunice eBan 8/10/2024 4:49 PM Dictation workstation:   [id number]      Radiology:     XR chest 1 view   Final Result   No acute cardiopulmonary process.        MACRO:   None        Signed by: Eunice Bean 8/10/2024 4:49 PM   Dictation workstation:   [id number]          Problem List:     Patient Active Problem List   Diagnosis    Chest pain, unspecified    Mixed hyperlipidemia    Paroxysmal SVT (supraventricular tachycardia) (CMS-HCC)    Coronary artery disease of native artery of native heart with stable angina pectoris (CMS-HCC)    Anxiety    Congestive heart failure (Multi)    Degenerative lumbar spinal stenosis    Cervical discogenic pain syndrome    Gastroesophageal reflux disease    Seasonal  allergic rhinitis       Assessment:         54-year-old female seen and evaluated at the bedside in the telemetry unit.    Bedside examination evaluation and interview were performed by me.    Chart reviewed in detail discussed the patient at length.    Impression:  ASHD class II  Unstable angina  Known coronary disease with prior PCI and stenting on 2 occasions presumably LAD but details not readily available from other institution  GERD  Hyperlipidemia  Anxiety depression  Cervical spine disease and prior surgery    Plan:     Recommendation:  Patient will continue telemetry observation  Patient will be on aggressive medical management for the time being with catheterization planned for tomorrow  Disposition will follow catheterization  General Medicine will address other medical matters  Should the patient develop any unstable stable issues can be moved into the intensive care or consider for an emergency procedure if necessary.    Thank you for the opportunity to participate in the care of your patient.  Please do not hesitate to call if you have any questions.    Electronically signed by Tommy Carepnter DO, on 8/11/2024 at 8:53 AM

## 2024-08-11 NOTE — PROGRESS NOTES
ASSESSMENT & PLAN:     Principal Problem:    Chest pain, unspecified  Active Problems:    Mixed hyperlipidemia    Paroxysmal SVT (supraventricular tachycardia) (CMS-[facility])    Coronary artery disease of native artery of native heart with stable angina pectoris (CMS-[facility])    Gastroesophageal reflux disease     Patient presented with chest pain likely stable angina.  She has history of coronary artery disease and uses aspirin and as needed nitroglycerin.  -Trend another troponin.  -Check D-dimer  -She had echocardiogram obtained at another facility will request her record.  -Sublingual nitroglycerin.  Optimize pain control  -Cardiology consult.  -Keep n.p.o. after midnight for possible cardiac cath for persistent pain  -Monitor hemodynamic status.  -Monitor serum electrolytes and correct as indicated.  Will start replacing magnesium.  -Check hemoglobin A1c.  She has hyperglycemia no history of diabetes.  -She has history of paroxysmal ventricular tachycardia currently on low-dose metoprolol.  Will resume that.  -Also history of chronic GERD.  Will start pantoprazole.    8/11/24  -cards following, plan for cath tomorrow, npo after mn  -trops neg here, ecg neg for acute ischemic changes. D-dimer was below age adjusted cutoff.   -cont asa, statin, bb  -nitroglycerin, morphine prn for cp  -cont tele    Prince Petty MD    SUBJECTIVE     NAEON. No acute complaints.       OBJECTIVE:       Last Recorded Vitals:  Vitals:    08/11/24 0010 08/11/24 0011 08/11/24 0444 08/11/24 0855   BP: 86/56 91/57 107/57 94/54   BP Location: Right arm Right arm Right arm    Patient Position: Lying Lying Lying    Pulse: 61 59 59 74   Resp: 16 16 16    Temp: 35.6 °C (96.1 °F)  36 °C (96.8 °F) 36 °C (96.8 °F)   TempSrc: Temporal  Temporal    SpO2: 96% 95% 97% 96%   Weight:       Height:           Last I/O:  No intake/output data recorded.    Physical Exam:  GEN: healthy appearing, appears stated age, NAD  CV: RRR, no m/r/g, no LE edema  LUNGS: CTAB,  no w/r/c  ABD: soft, NT, ND, NBS  SKIN: no rashes  MSK; no gross deformities, normal joints  NEURO: A+Ox3, no FND  PSYCH: appropriate mood, affect    Inpatient Medications:  aspirin, 81 mg, oral, Daily  atorvastatin, 80 mg, oral, Nightly  magnesium oxide, 400 mg, oral, BID  metoprolol tartrate, 12.5 mg, oral, BID  pantoprazole, 40 mg, oral, Daily before breakfast   Or  pantoprazole, 40 mg, intravenous, Daily before breakfast        PRN Medications  PRN medications: acetaminophen **OR** acetaminophen **OR** acetaminophen, melatonin, morphine, nitroglycerin, ondansetron **OR** ondansetron, polyethylene glycol    Continuous Medications:         LABS AND IMAGING:     Labs:  Results for orders placed or performed during the hospital encounter of 08/10/24 (from the past 24 hour(s))   B-Type Natriuretic Peptide   Result Value Ref Range    BNP 20 0 - 99 pg/mL   Magnesium   Result Value Ref Range    Magnesium 1.83 1.60 - 2.40 mg/dL   Comprehensive Metabolic Panel   Result Value Ref Range    Glucose 133 (H) 74 - 99 mg/dL    Sodium 133 (L) 136 - 145 mmol/L    Potassium 3.6 3.5 - 5.3 mmol/L    Chloride 101 98 - 107 mmol/L    Bicarbonate 24 21 - 32 mmol/L    Anion Gap 12 10 - 20 mmol/L    Urea Nitrogen 12 6 - 23 mg/dL    Creatinine 0.62 0.50 - 1.05 mg/dL    eGFR >90 >60 mL/min/1.73m*2    Calcium 8.6 8.6 - 10.3 mg/dL    Albumin 3.7 3.4 - 5.0 g/dL    Alkaline Phosphatase 60 33 - 110 U/L    Total Protein 6.4 6.4 - 8.2 g/dL    AST 11 9 - 39 U/L    Bilirubin, Total 0.3 0.0 - 1.2 mg/dL    ALT 10 7 - 45 U/L   CBC and Auto Differential   Result Value Ref Range    WBC 8.8 4.4 - 11.3 x10*3/uL    nRBC 0.0 0.0 - 0.0 /100 WBCs    RBC 3.56 (L) 4.00 - 5.20 x10*6/uL    Hemoglobin 11.4 (L) 12.0 - 16.0 g/dL    Hematocrit 34.6 (L) 36.0 - 46.0 %    MCV 97 80 - 100 fL    MCH 32.0 26.0 - 34.0 pg    MCHC 32.9 32.0 - 36.0 g/dL    RDW 13.3 11.5 - 14.5 %    Platelets 249 150 - 450 x10*3/uL    Neutrophils % 53.2 40.0 - 80.0 %    Immature Granulocytes %,  Automated 0.2 0.0 - 0.9 %    Lymphocytes % 39.7 13.0 - 44.0 %    Monocytes % 4.2 2.0 - 10.0 %    Eosinophils % 2.2 0.0 - 6.0 %    Basophils % 0.5 0.0 - 2.0 %    Neutrophils Absolute 4.67 1.20 - 7.70 x10*3/uL    Immature Granulocytes Absolute, Automated 0.02 0.00 - 0.70 x10*3/uL    Lymphocytes Absolute 3.48 1.20 - 4.80 x10*3/uL    Monocytes Absolute 0.37 0.10 - 1.00 x10*3/uL    Eosinophils Absolute 0.19 0.00 - 0.70 x10*3/uL    Basophils Absolute 0.04 0.00 - 0.10 x10*3/uL   Troponin I, High Sensitivity, Initial   Result Value Ref Range    Troponin I, High Sensitivity 5 0 - 13 ng/L   Hemoglobin A1c   Result Value Ref Range    Hemoglobin A1C 5.7 (H) see below %    Estimated Average Glucose 117 Not Established mg/dL   ECG 12 Lead   Result Value Ref Range    Ventricular Rate 72 BPM    Atrial Rate 72 BPM    MO Interval 164 ms    QRS Duration 78 ms    QT Interval 418 ms    QTC Calculation(Bazett) 457 ms    P Axis 27 degrees    R Axis -6 degrees    T Axis 44 degrees    QRS Count 12 beats    Q Onset 220 ms    P Onset 138 ms    P Offset 183 ms    T Offset 429 ms    QTC Fredericia 444 ms   Troponin, High Sensitivity, 1 Hour   Result Value Ref Range    Troponin I, High Sensitivity 6 0 - 13 ng/L   D-dimer, VTE Exclusion   Result Value Ref Range    D-Dimer, Quantitative VTE Exclusion 532 (H) <=500 ng/mL FEU   SST TOP   Result Value Ref Range    Extra Tube Hold for add-ons.    CBC   Result Value Ref Range    WBC 7.9 4.4 - 11.3 x10*3/uL    nRBC 0.0 0.0 - 0.0 /100 WBCs    RBC 3.74 (L) 4.00 - 5.20 x10*6/uL    Hemoglobin 12.0 12.0 - 16.0 g/dL    Hematocrit 36.4 36.0 - 46.0 %    MCV 97 80 - 100 fL    MCH 32.1 26.0 - 34.0 pg    MCHC 33.0 32.0 - 36.0 g/dL    RDW 13.5 11.5 - 14.5 %    Platelets 255 150 - 450 x10*3/uL   Comprehensive metabolic panel   Result Value Ref Range    Glucose 89 74 - 99 mg/dL    Sodium 139 136 - 145 mmol/L    Potassium 4.2 3.5 - 5.3 mmol/L    Chloride 106 98 - 107 mmol/L    Bicarbonate 27 21 - 32 mmol/L    Anion  Gap 10 10 - 20 mmol/L    Urea Nitrogen 8 6 - 23 mg/dL    Creatinine 0.63 0.50 - 1.05 mg/dL    eGFR >90 >60 mL/min/1.73m*2    Calcium 8.9 8.6 - 10.3 mg/dL    Albumin 3.7 3.4 - 5.0 g/dL    Alkaline Phosphatase 61 33 - 110 U/L    Total Protein 6.1 (L) 6.4 - 8.2 g/dL    AST 10 9 - 39 U/L    Bilirubin, Total 0.3 0.0 - 1.2 mg/dL    ALT 9 7 - 45 U/L   Troponin I, High Sensitivity   Result Value Ref Range    Troponin I, High Sensitivity 5 0 - 13 ng/L   ECG 12 lead   Result Value Ref Range    Ventricular Rate 65 BPM    Atrial Rate 65 BPM    MT Interval 170 ms    QRS Duration 78 ms    QT Interval 424 ms    QTC Calculation(Bazett) 440 ms    P Axis 19 degrees    R Axis -2 degrees    T Axis 51 degrees    QRS Count 11 beats    Q Onset 220 ms    P Onset 135 ms    P Offset 185 ms    T Offset 432 ms    QTC Fredericia 435 ms        Imaging:  ECG 12 lead  Normal sinus rhythm  Normal ECG  When compared with ECG of 10-AUG-2024 16:12, (unconfirmed)  No significant change was found

## 2024-08-12 ENCOUNTER — HOSPITAL ENCOUNTER (OUTPATIENT)
Dept: CARDIOLOGY | Facility: HOSPITAL | Age: 54
Setting detail: OBSERVATION
Discharge: HOME | End: 2024-08-12
Payer: MEDICAID

## 2024-08-12 ENCOUNTER — APPOINTMENT (OUTPATIENT)
Dept: CARDIOLOGY | Facility: HOSPITAL | Age: 54
End: 2024-08-12
Payer: MEDICAID

## 2024-08-12 LAB
ACT BLD: 260 SEC (ref 89–169)
ACT BLD: 364 SEC (ref 89–169)
ANION GAP SERPL CALC-SCNC: 11 MMOL/L (ref 10–20)
ATRIAL RATE: 47 BPM
ATRIAL RATE: 53 BPM
ATRIAL RATE: 65 BPM
ATRIAL RATE: 66 BPM
ATRIAL RATE: 67 BPM
BUN SERPL-MCNC: 11 MG/DL (ref 6–23)
CALCIUM SERPL-MCNC: 8.9 MG/DL (ref 8.6–10.3)
CARDIAC TROPONIN I PNL SERPL HS: 1257 NG/L (ref 0–13)
CARDIAC TROPONIN I PNL SERPL HS: 18 NG/L (ref 0–13)
CARDIAC TROPONIN I PNL SERPL HS: 3868 NG/L (ref 0–13)
CHLORIDE SERPL-SCNC: 105 MMOL/L (ref 98–107)
CO2 SERPL-SCNC: 26 MMOL/L (ref 21–32)
CREAT SERPL-MCNC: 0.62 MG/DL (ref 0.5–1.05)
EGFRCR SERPLBLD CKD-EPI 2021: >90 ML/MIN/1.73M*2
GLUCOSE SERPL-MCNC: 91 MG/DL (ref 74–99)
HOLD SPECIMEN: NORMAL
HOLD SPECIMEN: NORMAL
MAGNESIUM SERPL-MCNC: 2.11 MG/DL (ref 1.6–2.4)
P AXIS: 15 DEGREES
P AXIS: 19 DEGREES
P AXIS: 23 DEGREES
P AXIS: 25 DEGREES
P AXIS: 28 DEGREES
P OFFSET: 180 MS
P OFFSET: 182 MS
P OFFSET: 184 MS
P OFFSET: 185 MS
P OFFSET: 188 MS
P ONSET: 134 MS
P ONSET: 134 MS
P ONSET: 135 MS
P ONSET: 136 MS
P ONSET: 138 MS
POTASSIUM SERPL-SCNC: 4.1 MMOL/L (ref 3.5–5.3)
PR INTERVAL: 166 MS
PR INTERVAL: 168 MS
PR INTERVAL: 168 MS
PR INTERVAL: 170 MS
PR INTERVAL: 170 MS
Q ONSET: 217 MS
Q ONSET: 219 MS
Q ONSET: 220 MS
Q ONSET: 220 MS
Q ONSET: 222 MS
QRS COUNT: 11 BEATS
QRS COUNT: 8 BEATS
QRS COUNT: 9 BEATS
QRS DURATION: 78 MS
QRS DURATION: 78 MS
QRS DURATION: 80 MS
QRS DURATION: 82 MS
QRS DURATION: 88 MS
QT INTERVAL: 424 MS
QT INTERVAL: 426 MS
QT INTERVAL: 428 MS
QT INTERVAL: 466 MS
QT INTERVAL: 478 MS
QTC CALCULATION(BAZETT): 423 MS
QTC CALCULATION(BAZETT): 437 MS
QTC CALCULATION(BAZETT): 440 MS
QTC CALCULATION(BAZETT): 448 MS
QTC CALCULATION(BAZETT): 450 MS
QTC FREDERICIA: 435 MS
QTC FREDERICIA: 440 MS
QTC FREDERICIA: 442 MS
QTC FREDERICIA: 442 MS
QTC FREDERICIA: 447 MS
R AXIS: -11 DEGREES
R AXIS: -11 DEGREES
R AXIS: -2 DEGREES
SODIUM SERPL-SCNC: 138 MMOL/L (ref 136–145)
T AXIS: 1 DEGREES
T AXIS: 24 DEGREES
T AXIS: 28 DEGREES
T AXIS: 32 DEGREES
T AXIS: 51 DEGREES
T OFFSET: 431 MS
T OFFSET: 432 MS
T OFFSET: 435 MS
T OFFSET: 453 MS
T OFFSET: 458 MS
VENTRICULAR RATE: 47 BPM
VENTRICULAR RATE: 53 BPM
VENTRICULAR RATE: 65 BPM
VENTRICULAR RATE: 66 BPM
VENTRICULAR RATE: 67 BPM

## 2024-08-12 PROCEDURE — G0269 OCCLUSIVE DEVICE IN VEIN ART: HCPCS | Mod: 59 | Performed by: INTERNAL MEDICINE

## 2024-08-12 PROCEDURE — 93005 ELECTROCARDIOGRAM TRACING: CPT

## 2024-08-12 PROCEDURE — 2500000001 HC RX 250 WO HCPCS SELF ADMINISTERED DRUGS (ALT 637 FOR MEDICARE OP): Performed by: INTERNAL MEDICINE

## 2024-08-12 PROCEDURE — 92928 PRQ TCAT PLMT NTRAC ST 1 LES: CPT | Performed by: INTERNAL MEDICINE

## 2024-08-12 PROCEDURE — 2500000004 HC RX 250 GENERAL PHARMACY W/ HCPCS (ALT 636 FOR OP/ED): Performed by: INTERNAL MEDICINE

## 2024-08-12 PROCEDURE — 2720000007 HC OR 272 NO HCPCS: Performed by: INTERNAL MEDICINE

## 2024-08-12 PROCEDURE — 2500000004 HC RX 250 GENERAL PHARMACY W/ HCPCS (ALT 636 FOR OP/ED): Performed by: NURSE PRACTITIONER

## 2024-08-12 PROCEDURE — 84484 ASSAY OF TROPONIN QUANT: CPT | Performed by: STUDENT IN AN ORGANIZED HEALTH CARE EDUCATION/TRAINING PROGRAM

## 2024-08-12 PROCEDURE — 99024 POSTOP FOLLOW-UP VISIT: CPT | Performed by: NURSE PRACTITIONER

## 2024-08-12 PROCEDURE — 2500000005 HC RX 250 GENERAL PHARMACY W/O HCPCS: Performed by: INTERNAL MEDICINE

## 2024-08-12 PROCEDURE — C9113 INJ PANTOPRAZOLE SODIUM, VIA: HCPCS | Performed by: INTERNAL MEDICINE

## 2024-08-12 PROCEDURE — 93010 ELECTROCARDIOGRAM REPORT: CPT | Performed by: INTERNAL MEDICINE

## 2024-08-12 PROCEDURE — B2111ZZ FLUOROSCOPY OF MULTIPLE CORONARY ARTERIES USING LOW OSMOLAR CONTRAST: ICD-10-PCS | Performed by: INTERNAL MEDICINE

## 2024-08-12 PROCEDURE — 99152 MOD SED SAME PHYS/QHP 5/>YRS: CPT | Performed by: INTERNAL MEDICINE

## 2024-08-12 PROCEDURE — 93458 L HRT ARTERY/VENTRICLE ANGIO: CPT | Performed by: INTERNAL MEDICINE

## 2024-08-12 PROCEDURE — 7100000009 HC PHASE TWO TIME - INITIAL BASE CHARGE: Performed by: INTERNAL MEDICINE

## 2024-08-12 PROCEDURE — 36415 COLL VENOUS BLD VENIPUNCTURE: CPT | Performed by: INTERNAL MEDICINE

## 2024-08-12 PROCEDURE — 99153 MOD SED SAME PHYS/QHP EA: CPT | Performed by: INTERNAL MEDICINE

## 2024-08-12 PROCEDURE — 83735 ASSAY OF MAGNESIUM: CPT | Performed by: INTERNAL MEDICINE

## 2024-08-12 PROCEDURE — C9600 PERC DRUG-EL COR STENT SING: HCPCS | Mod: LD | Performed by: INTERNAL MEDICINE

## 2024-08-12 PROCEDURE — 99239 HOSP IP/OBS DSCHRG MGMT >30: CPT

## 2024-08-12 PROCEDURE — C1725 CATH, TRANSLUMIN NON-LASER: HCPCS | Performed by: INTERNAL MEDICINE

## 2024-08-12 PROCEDURE — 85347 COAGULATION TIME ACTIVATED: CPT | Performed by: INTERNAL MEDICINE

## 2024-08-12 PROCEDURE — 7100000010 HC PHASE TWO TIME - EACH INCREMENTAL 1 MINUTE: Performed by: INTERNAL MEDICINE

## 2024-08-12 PROCEDURE — C1887 CATHETER, GUIDING: HCPCS | Performed by: INTERNAL MEDICINE

## 2024-08-12 PROCEDURE — 2550000001 HC RX 255 CONTRASTS: Performed by: INTERNAL MEDICINE

## 2024-08-12 PROCEDURE — 84484 ASSAY OF TROPONIN QUANT: CPT | Performed by: INTERNAL MEDICINE

## 2024-08-12 PROCEDURE — 85347 COAGULATION TIME ACTIVATED: CPT

## 2024-08-12 PROCEDURE — 2500000001 HC RX 250 WO HCPCS SELF ADMINISTERED DRUGS (ALT 637 FOR MEDICARE OP): Performed by: STUDENT IN AN ORGANIZED HEALTH CARE EDUCATION/TRAINING PROGRAM

## 2024-08-12 PROCEDURE — B2151ZZ FLUOROSCOPY OF LEFT HEART USING LOW OSMOLAR CONTRAST: ICD-10-PCS | Performed by: INTERNAL MEDICINE

## 2024-08-12 PROCEDURE — 80048 BASIC METABOLIC PNL TOTAL CA: CPT | Performed by: INTERNAL MEDICINE

## 2024-08-12 PROCEDURE — G0378 HOSPITAL OBSERVATION PER HR: HCPCS

## 2024-08-12 PROCEDURE — 027034Z DILATION OF CORONARY ARTERY, ONE ARTERY WITH DRUG-ELUTING INTRALUMINAL DEVICE, PERCUTANEOUS APPROACH: ICD-10-PCS | Performed by: INTERNAL MEDICINE

## 2024-08-12 PROCEDURE — C1769 GUIDE WIRE: HCPCS | Performed by: INTERNAL MEDICINE

## 2024-08-12 PROCEDURE — 2780000003 HC OR 278 NO HCPCS: Performed by: INTERNAL MEDICINE

## 2024-08-12 PROCEDURE — 99233 SBSQ HOSP IP/OBS HIGH 50: CPT | Performed by: INTERNAL MEDICINE

## 2024-08-12 PROCEDURE — 2500000001 HC RX 250 WO HCPCS SELF ADMINISTERED DRUGS (ALT 637 FOR MEDICARE OP): Performed by: NURSE PRACTITIONER

## 2024-08-12 PROCEDURE — C1874 STENT, COATED/COV W/DEL SYS: HCPCS | Performed by: INTERNAL MEDICINE

## 2024-08-12 PROCEDURE — C1760 CLOSURE DEV, VASC: HCPCS | Performed by: INTERNAL MEDICINE

## 2024-08-12 PROCEDURE — 4A023N7 MEASUREMENT OF CARDIAC SAMPLING AND PRESSURE, LEFT HEART, PERCUTANEOUS APPROACH: ICD-10-PCS | Performed by: INTERNAL MEDICINE

## 2024-08-12 DEVICE — STENT ONYXNG30022UX ONYX 3.00X22RX
Type: IMPLANTABLE DEVICE | Site: CORONARY | Status: FUNCTIONAL
Brand: ONYX FRONTIER™

## 2024-08-12 RX ORDER — ONDANSETRON HYDROCHLORIDE 2 MG/ML
INJECTION, SOLUTION INTRAVENOUS AS NEEDED
Status: DISCONTINUED | OUTPATIENT
Start: 2024-08-12 | End: 2024-08-12 | Stop reason: HOSPADM

## 2024-08-12 RX ORDER — FENTANYL CITRATE 50 UG/ML
INJECTION, SOLUTION INTRAMUSCULAR; INTRAVENOUS AS NEEDED
Status: DISCONTINUED | OUTPATIENT
Start: 2024-08-12 | End: 2024-08-12 | Stop reason: HOSPADM

## 2024-08-12 RX ORDER — HEPARIN SODIUM 1000 [USP'U]/ML
INJECTION, SOLUTION INTRAVENOUS; SUBCUTANEOUS AS NEEDED
Status: DISCONTINUED | OUTPATIENT
Start: 2024-08-12 | End: 2024-08-12 | Stop reason: HOSPADM

## 2024-08-12 RX ORDER — CLOPIDOGREL BISULFATE 75 MG/1
75 TABLET ORAL DAILY
Qty: 30 TABLET | Refills: 11 | Status: SHIPPED | OUTPATIENT
Start: 2024-08-13 | End: 2025-08-13

## 2024-08-12 RX ORDER — CLOPIDOGREL BISULFATE 300 MG/1
TABLET, FILM COATED ORAL AS NEEDED
Status: DISCONTINUED | OUTPATIENT
Start: 2024-08-12 | End: 2024-08-12 | Stop reason: HOSPADM

## 2024-08-12 RX ORDER — ASPIRIN 325 MG
TABLET ORAL AS NEEDED
Status: DISCONTINUED | OUTPATIENT
Start: 2024-08-12 | End: 2024-08-12 | Stop reason: HOSPADM

## 2024-08-12 RX ORDER — LIDOCAINE HYDROCHLORIDE 20 MG/ML
INJECTION, SOLUTION INFILTRATION; PERINEURAL AS NEEDED
Status: DISCONTINUED | OUTPATIENT
Start: 2024-08-12 | End: 2024-08-12 | Stop reason: HOSPADM

## 2024-08-12 RX ORDER — NICARDIPINE HYDROCHLORIDE 2.5 MG/ML
INJECTION INTRAVENOUS AS NEEDED
Status: DISCONTINUED | OUTPATIENT
Start: 2024-08-12 | End: 2024-08-12 | Stop reason: HOSPADM

## 2024-08-12 RX ORDER — METOPROLOL TARTRATE 25 MG/1
12.5 TABLET, FILM COATED ORAL 2 TIMES DAILY
Qty: 30 TABLET | Refills: 5 | Status: SHIPPED | OUTPATIENT
Start: 2024-08-12 | End: 2024-08-12 | Stop reason: HOSPADM

## 2024-08-12 RX ORDER — CLOPIDOGREL BISULFATE 75 MG/1
75 TABLET ORAL DAILY
Status: DISCONTINUED | OUTPATIENT
Start: 2024-08-13 | End: 2024-08-13 | Stop reason: HOSPADM

## 2024-08-12 RX ORDER — MIDAZOLAM HYDROCHLORIDE 1 MG/ML
INJECTION, SOLUTION INTRAMUSCULAR; INTRAVENOUS AS NEEDED
Status: DISCONTINUED | OUTPATIENT
Start: 2024-08-12 | End: 2024-08-12 | Stop reason: HOSPADM

## 2024-08-12 RX ORDER — SODIUM CHLORIDE 9 MG/ML
100 INJECTION, SOLUTION INTRAVENOUS CONTINUOUS
Status: DISCONTINUED | OUTPATIENT
Start: 2024-08-12 | End: 2024-08-12

## 2024-08-12 RX ORDER — MORPHINE SULFATE 4 MG/ML
2 INJECTION, SOLUTION INTRAMUSCULAR; INTRAVENOUS ONCE
Status: COMPLETED | OUTPATIENT
Start: 2024-08-12 | End: 2024-08-12

## 2024-08-12 RX ORDER — ALUMINUM HYDROXIDE, MAGNESIUM HYDROXIDE, AND SIMETHICONE 1200; 120; 1200 MG/30ML; MG/30ML; MG/30ML
30 SUSPENSION ORAL 4 TIMES DAILY PRN
Status: DISCONTINUED | OUTPATIENT
Start: 2024-08-12 | End: 2024-08-13 | Stop reason: HOSPADM

## 2024-08-12 RX ORDER — NITROGLYCERIN 5 MG/ML
INJECTION, SOLUTION INTRAVENOUS AS NEEDED
Status: DISCONTINUED | OUTPATIENT
Start: 2024-08-12 | End: 2024-08-12 | Stop reason: HOSPADM

## 2024-08-12 RX ADMIN — MORPHINE SULFATE 2 MG: 2 INJECTION, SOLUTION INTRAMUSCULAR; INTRAVENOUS at 17:20

## 2024-08-12 RX ADMIN — MAGNESIUM OXIDE 400 MG (241.3 MG MAGNESIUM) TABLET 400 MG: TABLET at 14:03

## 2024-08-12 RX ADMIN — PANTOPRAZOLE SODIUM 40 MG: 40 INJECTION, POWDER, FOR SOLUTION INTRAVENOUS at 06:47

## 2024-08-12 RX ADMIN — NITROGLYCERIN 0.4 MG: 0.4 TABLET SUBLINGUAL at 17:11

## 2024-08-12 RX ADMIN — ALUMINUM HYDROXIDE, MAGNESIUM HYDROXIDE, AND SIMETHICONE 30 ML: 200; 200; 20 SUSPENSION ORAL at 10:59

## 2024-08-12 RX ADMIN — MORPHINE SULFATE 2 MG: 2 INJECTION, SOLUTION INTRAMUSCULAR; INTRAVENOUS at 21:06

## 2024-08-12 RX ADMIN — SODIUM CHLORIDE 100 ML/HR: 9 INJECTION, SOLUTION INTRAVENOUS at 10:38

## 2024-08-12 RX ADMIN — MORPHINE SULFATE 2 MG: 2 INJECTION, SOLUTION INTRAMUSCULAR; INTRAVENOUS at 11:20

## 2024-08-12 RX ADMIN — NITROGLYCERIN 0.5 INCH: 20 OINTMENT TOPICAL at 18:52

## 2024-08-12 RX ADMIN — MORPHINE SULFATE 2 MG: 2 INJECTION, SOLUTION INTRAMUSCULAR; INTRAVENOUS at 00:31

## 2024-08-12 RX ADMIN — ONDANSETRON 4 MG: 2 INJECTION INTRAMUSCULAR; INTRAVENOUS at 19:49

## 2024-08-12 RX ADMIN — NITROGLYCERIN 0.4 MG: 0.4 TABLET SUBLINGUAL at 17:03

## 2024-08-12 RX ADMIN — MAGNESIUM OXIDE 400 MG (241.3 MG MAGNESIUM) TABLET 400 MG: TABLET at 21:31

## 2024-08-12 RX ADMIN — MORPHINE SULFATE 2 MG: 4 INJECTION, SOLUTION INTRAMUSCULAR; INTRAVENOUS at 13:04

## 2024-08-12 RX ADMIN — ATORVASTATIN CALCIUM 80 MG: 80 TABLET, FILM COATED ORAL at 21:31

## 2024-08-12 RX ADMIN — MORPHINE SULFATE 2 MG: 2 INJECTION, SOLUTION INTRAMUSCULAR; INTRAVENOUS at 06:47

## 2024-08-12 ASSESSMENT — PAIN SCALES - GENERAL
PAINLEVEL_OUTOF10: 6
PAINLEVEL_OUTOF10: 0 - NO PAIN
PAINLEVEL_OUTOF10: 2
PAINLEVEL_OUTOF10: 6
PAINLEVEL_OUTOF10: 5 - MODERATE PAIN
PAINLEVEL_OUTOF10: 8
PAINLEVEL_OUTOF10: 5 - MODERATE PAIN
PAINLEVEL_OUTOF10: 7
PAINLEVEL_OUTOF10: 6
PAINLEVEL_OUTOF10: 5 - MODERATE PAIN
PAINLEVEL_OUTOF10: 7
PAINLEVEL_OUTOF10: 5 - MODERATE PAIN
PAINLEVEL_OUTOF10: 7
PAINLEVEL_OUTOF10: 7

## 2024-08-12 ASSESSMENT — COGNITIVE AND FUNCTIONAL STATUS - GENERAL
MOBILITY SCORE: 24
DAILY ACTIVITIY SCORE: 24
DAILY ACTIVITIY SCORE: 24
MOBILITY SCORE: 24

## 2024-08-12 ASSESSMENT — PAIN DESCRIPTION - LOCATION
LOCATION: CHEST

## 2024-08-12 ASSESSMENT — PAIN - FUNCTIONAL ASSESSMENT
PAIN_FUNCTIONAL_ASSESSMENT: 0-10

## 2024-08-12 NOTE — NURSING NOTE
Patient arrived  in [facility] from Cath Lab s/p PCI.  On arrival focused assessment completed and WDL.  Right Femoral arterial sheath puncture site is stable.  Dressing is clean/dry/intact; site is soft with no oozing or hematoma.  Patient has no family here at this time per report; per patient they will probably come later.  Patient has no needs at this time.

## 2024-08-12 NOTE — DISCHARGE SUMMARY
Discharge Diagnosis  Chest pain, unspecified    Issues Requiring Follow-Up  Chest pain    Discharge Meds     Your medication list        START taking these medications        Instructions Last Dose Given Next Dose Due   clopidogrel 75 mg tablet  Commonly known as: Plavix  Start taking on: August 13, 2024      Take 1 tablet (75 mg) by mouth once daily for 365 doses. Do not fill before August 13, 2024.              CONTINUE taking these medications        Instructions Last Dose Given Next Dose Due   aspirin 81 mg EC tablet           atorvastatin 80 mg tablet  Commonly known as: Lipitor           nitroglycerin 0.4 mg SL tablet  Commonly known as: Nitrostat                     Where to Get Your Medications        These medications were sent to [facility] [address] [place] [address] [address] [facility]  [address] [facility] [address], [facility] [address]      Phone: [phone]   clopidogrel 75 mg tablet         Test Results Pending At Discharge  Pending Labs       No current pending labs.            Hospital Course   This is a 53 yo patient with PMHx including CAD s/p stent placement, HLD, PSVT who presented on 8/10 for intermittent chest pain. Was at [facility] initially for unstable angina, plan for cath there but left AMA. Cardiology was consulted and performed a cardiac catheterization, which showed 95% occlusion of the proximal LAD and a stent was placed. Trialed on a beta blocker but developed hypotension and bradycardia. Recommend continuing aspirin, statin, and nitroglycerin, new prescription for Plavix sent to pharmacy. Follow up with Dr. Browning scheduled. Recommend follow up with PCP. For all other non-hospital issues, resume home meds.    Pertinent Physical Exam At Time of Discharge  Physical Exam  Constitutional:       Appearance: Normal appearance.   HENT:      Head: Normocephalic.      Mouth/Throat:      Mouth: Mucous membranes are moist.   Eyes:      Pupils: Pupils are equal, round, and reactive to  light.   Cardiovascular:      Rate and Rhythm: Normal rate and regular rhythm.      Heart sounds: Normal heart sounds, S1 normal and S2 normal.   Pulmonary:      Effort: Pulmonary effort is normal.      Breath sounds: Normal breath sounds.   Abdominal:      General: Bowel sounds are normal.      Palpations: Abdomen is soft.   Musculoskeletal:         General: Normal range of motion.      Cervical back: Neck supple.   Skin:     General: Skin is warm.   Neurological:      Mental Status: She is alert and oriented to person, place, and time.   Psychiatric:         Mood and Affect: Mood normal.         Behavior: Behavior normal.       Outpatient Follow-Up  Future Appointments   Date Time Provider Department Center   8/26/2024 12:30 PM Tommy Carpenter DO [id number] [place]         Edelmira Guthrie PA-C  I spent 35 minutes on discharge. Time calculated includes bedside evaluation, counseling, and outpatient care coordination.

## 2024-08-12 NOTE — POST-PROCEDURE NOTE
Physician Transition of Care Summary  Invasive Cardiovascular Lab    Procedure Date: 8/12/2024  Attending:    Adilia Barragan - Primary  Resident/Fellow/Other Assistant: Surgeons and Role:  * No surgeons found with a matching role *    Pre Procedure Diagnosis:   CAD, remote PCI, unstable angina pectoris    Post Procedure Diagnosis:   Single-vessel CAD, 95% proximal LAD, in-stent restenosis, successful JESSICA of proximal LAD, normal left ventricular systolic function    Complications:   None    Stents/Implants:   JESSICA of proximal LAD    Anticoagulation/Antiplatelet Plan:   Aspirin 81 mg a day, Plavix 75 mg a day, no heparin    Estimated Blood Loss:   0 mL    Electronically signed by: Luis Barragan MD, 8/12/2024 10:38 AM    Anesthesia: Moderate                            anesthesia Staff: None

## 2024-08-12 NOTE — PROGRESS NOTES
Patient is stable status post LHC/PCI under the care of Dr. Barragan.  Discussed results of procedure with patient.  Pictures provided.  Findings of the LHC revealed 90% in-stent restenosis in the proximal LAD with diffuse disease throughout the remaining vessel, mild disease in the circumflex artery and RCA with an LVEF of 60%.  Patient underwent successful PCI with 1 JESSICA placed to the proximal LAD reducing that lesion down to 0% stenosis.  She tolerated the procedure well.  Please see procedural report for complete details.  Patient was initiated on Plavix 75 mg daily.  She will continue aspirin 81 mg daily as well as high intensity statin therapy.  Her beta-blocker will be discontinued at this time due to significant bradycardia with heart rates in the low 40s and hypotension with SBP 70s to 80s.  She can be discharged to home later today barring no complications.  She will be scheduled to follow-up with Dr. Carpenter in 1 to 2 weeks.  Postprocedural activity, restrictions, potential complications, medications and future follow-up discussed at length.  Lifestyle modifications discussed at length.  All questions answered.  Patient verbalized understanding.

## 2024-08-12 NOTE — NURSING NOTE
CT team unable to meet with pt at present time d/t pt currently off floor to Cath lab. CT team will follow up at later time.

## 2024-08-12 NOTE — SIGNIFICANT EVENT
Notified by nursing staff of patient's continued chest pain postprocedure.  Reviewed left heart catheterization results from today as well.  Patient had received nitroglycerin sublingual x 2, morphine 2 mg IV and pain was still a 6/10.  Troponin came back at 1,257.    EKG from 1657 today as well as earlier from today and yesterday have been reviewed.  Spoke with cardiology, will trial patient on Nitropaste.  If chest pain does not resolve or improve with the Nitropaste will discuss potential need to transfer to the ICU if nitroglycerin IV infusion is indicated.  Will continue to trend EKGs and troponin.    Discharge cancelled at this time.

## 2024-08-12 NOTE — NURSING NOTE
HOB increased 30 degrees. No change in groin site with position change.  Patient provided with water and kenyetta crackers.  Patient denies any needs at this time.

## 2024-08-12 NOTE — PROGRESS NOTES
[facility] Progress Note           Rounding Cardiologist:  Dr. Tommy Carpenter  Primary Cardiologist:     Date:  8/12/2024  Patient:  Fallon Stevens  YOB: 1970  MRN:  [id number]   Admit Date:  8/10/2024      SUBJECTIVE:    Fallon Stevens was seen and examined today at bedside.     She denies any chest pain or shortness of breath.           VITALS:     Vitals:    08/12/24 0630 08/12/24 0720 08/12/24 0908 08/12/24 0909   BP: 104/53 97/52 111/69    BP Location: Right arm      Patient Position: Lying      Pulse: 71 68 65    Resp: 16 16 11    Temp: 36.2 °C (97.2 °F) 36.2 °C (97.2 °F)     TempSrc: Temporal      SpO2: 95% 95% 95% 98%   Weight:       Height:         No intake or output data in the 24 hours ending 08/12/24 0937    Wt Readings from Last 4 Encounters:   08/10/24 77.1 kg (170 lb)       CURRENT MEDICATIONS:   aspirin, 81 mg, oral, Daily  atorvastatin, 80 mg, oral, Nightly  magnesium oxide, 400 mg, oral, BID  metoprolol tartrate, 12.5 mg, oral, BID         Current Outpatient Medications   Medication Instructions    aspirin 81 mg, oral, Daily    atorvastatin (LIPITOR) 80 mg, oral, Daily    nitroglycerin (NITROSTAT) 0.4 mg, sublingual, Every 5 min PRN        PHYSICAL EXAMINATION:   GENERAL:  Well developed, well nourished, in no acute distress.  CHEST:  Symmetric and nontender.  NEURO/PSYCH:  Alert and oriented times three with approppriate behavior and responses.  NECK:  Supple, no JVD, no bruit.  LUNGS:  Clear to auscultation bilaterally, normal respiratory effort.  HEART:  Rate and rhythm regular with no evident murmur, no gallop appreciated.        There are no rubs, clicks or heaves.  EXTREMITIES:  Warm with good color, no clubbing or cyanosis.  There is no edema noted.  PERIPHERAL VASCULAR:  Pulses present and equally palpable; 2+ throughout.      LAB DATA:     CBC:   Results from last 7 days   Lab Units 08/11/24  0610   WBC AUTO x10*3/uL 7.9   RBC AUTO x10*6/uL 3.74*   HEMOGLOBIN g/dL  "12.0   HEMATOCRIT % 36.4   PLATELETS AUTO x10*3/uL 255        CMP:    Results from last 7 days   Lab Units 08/12/24  0530   SODIUM mmol/L 138   POTASSIUM mmol/L 4.1   CHLORIDE mmol/L 105   CO2 mmol/L 26   BUN mg/dL 11   CREATININE mg/dL 0.62   GLUCOSE mg/dL 91   CALCIUM mg/dL 8.9       Cardiac Enzymes:    Lab Results   Component Value Date    TROPHS 5 08/11/2024    TROPHS 6 08/10/2024    TROPHS 5 08/10/2024       Magnesium:    Lab Results   Component Value Date    MG 2.11 08/12/2024       Lipid Profile:  No results found for: \"CHLPL\", \"TRIG\", \"HDL\", \"LDLCALC\", \"LDLDIRECT\"    TSH:  No results found for: \"TSH\"    BNP:   Lab Results   Component Value Date    BNP 20 08/10/2024        PT/INR:  No results found for: \"PROTIME\", \"INR\"    HgBA1c:    Lab Results   Component Value Date    HGBA1C 5.7 (H) 08/10/2024       CBC:  Lab Results   Component Value Date    WBC 7.9 08/11/2024    WBC 8.8 08/10/2024    RBC 3.74 (L) 08/11/2024    RBC 3.56 (L) 08/10/2024    HGB 12.0 08/11/2024    HGB 11.4 (L) 08/10/2024    HCT 36.4 08/11/2024    HCT 34.6 (L) 08/10/2024    MCV 97 08/11/2024    MCV 97 08/10/2024    MCH 32.1 08/11/2024    MCH 32.0 08/10/2024    MCHC 33.0 08/11/2024    MCHC 32.9 08/10/2024    RDW 13.5 08/11/2024    RDW 13.3 08/10/2024     08/11/2024     08/10/2024       BMP:  Lab Results   Component Value Date     08/12/2024     08/11/2024     (L) 08/10/2024    K 4.1 08/12/2024    K 4.2 08/11/2024    K 3.6 08/10/2024     08/12/2024     08/11/2024     08/10/2024    CO2 26 08/12/2024    CO2 27 08/11/2024    CO2 24 08/10/2024    BUN 11 08/12/2024    BUN 8 08/11/2024    BUN 12 08/10/2024    CREATININE 0.62 08/12/2024    CREATININE 0.63 08/11/2024    CREATININE 0.62 08/10/2024       Hepatic Function Panel:    Lab Results   Component Value Date    ALKPHOS 61 08/11/2024    ALT 9 08/11/2024    AST 10 08/11/2024    PROT 6.1 (L) 08/11/2024    BILITOT 0.3 08/11/2024       DIAGNOSTIC " TESTING:     ECG 12 Lead    Result Date: 8/11/2024  Normal sinus rhythm Normal ECG No previous ECGs available See ED provider note for full interpretation and clinical correlation Confirmed by Abigail Torres ([id number]) on 8/11/2024 5:36:17 PM    ECG 12 lead    Result Date: 8/11/2024  Normal sinus rhythm Normal ECG When compared with ECG of 10-AUG-2024 16:12, (unconfirmed) No significant change was found    XR chest 1 view    Result Date: 8/10/2024  Interpreted By:  Eunice Bean, STUDY: XR CHEST 1 VIEW;  8/10/2024 4:26 pm   INDICATION: Signs/Symptoms:Chest Pain.   COMPARISON: None.   ACCESSION NUMBER(S): [id number]   ORDERING CLINICIAN: KAITLYNN SEVILLA   FINDINGS: CARDIOMEDIASTINAL SILHOUETTE: Cardiomediastinal silhouette is normal in size and configuration.     LUNGS: Lungs are clear.   ABDOMEN: No remarkable upper abdominal findings.     BONES: No acute osseous changes. Status post ventral fusion lower cervical spine.       No acute cardiopulmonary process.   MACRO: None   Signed by: Eunice Bean 8/10/2024 4:49 PM Dictation workstation:   [id number]       RADIOLOGY:     XR chest 1 view   Final Result   No acute cardiopulmonary process.        MACRO:   None        Signed by: Eunice Bean 8/10/2024 4:49 PM   Dictation workstation:   [id number]      Cardiac Catheterization Procedure    (Results Pending)       PROBLEM LIST     Patient Active Problem List   Diagnosis    Chest pain, unspecified    Mixed hyperlipidemia    Paroxysmal SVT (supraventricular tachycardia) (CMS-HCC)    Coronary artery disease of native artery of native heart with stable angina pectoris (CMS-HCC)    Anxiety    Congestive heart failure (Multi)    Degenerative lumbar spinal stenosis    Cervical discogenic pain syndrome    Gastroesophageal reflux disease    Seasonal allergic rhinitis       ASSESSMENT:         54-year-old female seen and evaluated at the bedside in the telemetry unit.     Bedside examination evaluation and interview  were performed by me.     Chart reviewed in detail discussed the patient at length.     Impression:  ASHD class II  Unstable angina  Known coronary disease with prior PCI and stenting on 2 occasions presumably LAD but details not readily available from other institution  GERD  Hyperlipidemia  Anxiety depression  Cervical spine disease and prior surgery  PLAN:   Recommendation:  Patient for heart cath today with Dr. Barragan  Disposition to follow  Continue telemetry        Please do not hesitate to call with questions.  Electronically signed by Tommy Carpenter DO, on 8/12/2024 at 9:37 AM     [phone]

## 2024-08-12 NOTE — DISCHARGE INSTRUCTIONS
CARDIAC CATHETERIZATION DISCHARGE INSTRUCTIONS     FOR SUDDEN AND SEVERE CHEST PAIN, SHORTNESS OF BREATH, EXCESSIVE BLEEDING, SIGNS OF STROKE, OR CHANGES IN MENTAL STATUS YOU SHOULD CALL 911 IMMEDIATELY.     If your provider has prescribed aspirin and/or clopidogrel (Plavix), or prasugrel (Effient), or ticagrelor (Brilinta), DO NOT STOP THESE MEDICATIONS for any reason without talking to your cardiologist first. If any of these were prescribed, you must take them every day without missing a single dose. If you are getting low on these medications, contact your provider immediately for a refill.     FOR NEXT 24 HOURS  - Upon discharge, you should return home and rest for the remainder of the day and evening. You do not have to stay on bed rest but should not be very active.  It is recommended a responsible adult be with you for the first 24 hours after the procedure.    - No driving for 24 hours after procedure. Please arrange for someone to drive you home from the hospital today.     - Do not operate machinery or use power tools for 24 hours after your procedure.     - Do not make any legal decisions for 24 hours after your procedure.     - Do not drink alcoholic beverages for 24 hours after your procedure.    WOUND CARE   *FOR FEMORAL (LEG) ACCESS*  ·      Avoid heavy lifting (over 10 pounds) for 7 days, squatting or excessive bending for 7 days, and strenuous exercise for 7 days.  ·      No submerged bathing, swimming, or hot tubs for the next 7 days, or until fully healed.  ·      Avoid sexual activity for 3-4 days until any groin discomfort has ceased.       - The transparent dressing should be removed from the site 24 hours after the procedure.   Dressing can be removed 8/13/24 any time after 10:30 am    - You may shower the day after your procedure. Wash the site gently with soap and water. Rinse well and pat dry. Keep the area clean and dry. You may apply a Band-Aid to the site. Avoid lotions,  ointments, or powders until fully healed.     - It is normal to notice a small bruise around the puncture site and/or a small grape sized or smaller lump. Any large bruising or large lump warrants a call to the office.     - If bleeding should occur, lay down and apply pressure to the affected area for 10 minutes.  If the bleeding stops notify your physician.  If there is a large amount of bleeding or spurting of blood CALL 911 immediately.  DO NOT drive yourself to the hospital.    - You may experience some tenderness, bruising or minimal inflammation.  If you have any concerns or if any of these symptoms become excessive, contact your cardiologist or go to the emergency room.     OTHER INSTRUCTIONS    - You may take acetaminophen (Tylenol) as directed for discomfort.  If pain is not relieved with acetaminophen (Tylenol), contact your doctor.    - If you notice or experience any of the following, you should notify your doctor or seek medical attention  Chest pain or discomfort  Change in mental status or weakness in extremities.  Dizziness, light headedness, or feeling faint.  Change in the site where the procedure was performed, such as bleeding or an increased area of bruising or swelling.  Tingling, numbness, pain, or coolness in the leg/arm beyond the site where the procedure was performed.  Signs of infection (i.e. shaking chills, temperature > 100 degrees Fahrenheit, warmth, redness) in the leg/arm area where the procedure was performed.  Changes in urination   Bloody or black stools  Vomiting blood  Severe nose bleeds  Any excessive bleeding    - If you DO NOT have an appointment with your cardiologist within 2-4 weeks following your procedure, please contact their office.

## 2024-08-12 NOTE — NURSING NOTE
Patient returning to 9S. Report given to Adwoa JULIAN and patient placed in Patient Movement for transfer back to floor.  Right Groin is stable.  Dressing is clean/dry/intact; site is soft with no oozing or hematoma noted.  Patient to remain on bedrest until 1420.     no

## 2024-08-13 ENCOUNTER — HOSPITAL ENCOUNTER (OUTPATIENT)
Dept: CARDIOLOGY | Facility: HOSPITAL | Age: 54
Setting detail: OBSERVATION
Discharge: HOME | End: 2024-08-13
Payer: MEDICAID

## 2024-08-13 VITALS
WEIGHT: 170 LBS | TEMPERATURE: 97.2 F | HEART RATE: 83 BPM | SYSTOLIC BLOOD PRESSURE: 112 MMHG | DIASTOLIC BLOOD PRESSURE: 53 MMHG | OXYGEN SATURATION: 94 % | RESPIRATION RATE: 16 BRPM | BODY MASS INDEX: 27.32 KG/M2 | HEIGHT: 66 IN

## 2024-08-13 PROBLEM — R07.9 CHEST PAIN, UNSPECIFIED TYPE: Status: ACTIVE | Noted: 2024-08-13

## 2024-08-13 LAB
ANION GAP SERPL CALC-SCNC: 13 MMOL/L (ref 10–20)
ATRIAL RATE: 65 BPM
ATRIAL RATE: 66 BPM
ATRIAL RATE: 79 BPM
BASOPHILS # BLD AUTO: 0.03 X10*3/UL (ref 0–0.1)
BASOPHILS NFR BLD AUTO: 0.3 %
BUN SERPL-MCNC: 8 MG/DL (ref 6–23)
CALCIUM SERPL-MCNC: 8.7 MG/DL (ref 8.6–10.3)
CARDIAC TROPONIN I PNL SERPL HS: 6069 NG/L (ref 0–13)
CHLORIDE SERPL-SCNC: 103 MMOL/L (ref 98–107)
CO2 SERPL-SCNC: 25 MMOL/L (ref 21–32)
CREAT SERPL-MCNC: 0.56 MG/DL (ref 0.5–1.05)
EGFRCR SERPLBLD CKD-EPI 2021: >90 ML/MIN/1.73M*2
EOSINOPHIL # BLD AUTO: 0.07 X10*3/UL (ref 0–0.7)
EOSINOPHIL NFR BLD AUTO: 0.7 %
ERYTHROCYTE [DISTWIDTH] IN BLOOD BY AUTOMATED COUNT: 13.1 % (ref 11.5–14.5)
GLUCOSE SERPL-MCNC: 102 MG/DL (ref 74–99)
HCT VFR BLD AUTO: 33.5 % (ref 36–46)
HGB BLD-MCNC: 11.6 G/DL (ref 12–16)
HOLD SPECIMEN: NORMAL
IMM GRANULOCYTES # BLD AUTO: 0.03 X10*3/UL (ref 0–0.7)
IMM GRANULOCYTES NFR BLD AUTO: 0.3 % (ref 0–0.9)
LYMPHOCYTES # BLD AUTO: 2.44 X10*3/UL (ref 1.2–4.8)
LYMPHOCYTES NFR BLD AUTO: 25.4 %
MAGNESIUM SERPL-MCNC: 2.21 MG/DL (ref 1.6–2.4)
MCH RBC QN AUTO: 32.5 PG (ref 26–34)
MCHC RBC AUTO-ENTMCNC: 34.6 G/DL (ref 32–36)
MCV RBC AUTO: 94 FL (ref 80–100)
MONOCYTES # BLD AUTO: 0.49 X10*3/UL (ref 0.1–1)
MONOCYTES NFR BLD AUTO: 5.1 %
NEUTROPHILS # BLD AUTO: 6.53 X10*3/UL (ref 1.2–7.7)
NEUTROPHILS NFR BLD AUTO: 68.2 %
NRBC BLD-RTO: 0 /100 WBCS (ref 0–0)
P AXIS: 21 DEGREES
P AXIS: 27 DEGREES
P AXIS: 31 DEGREES
P OFFSET: 171 MS
P OFFSET: 190 MS
P OFFSET: 192 MS
P ONSET: 123 MS
P ONSET: 142 MS
P ONSET: 143 MS
PLATELET # BLD AUTO: 237 X10*3/UL (ref 150–450)
POTASSIUM SERPL-SCNC: 3.6 MMOL/L (ref 3.5–5.3)
PR INTERVAL: 158 MS
PR INTERVAL: 160 MS
PR INTERVAL: 168 MS
Q ONSET: 207 MS
Q ONSET: 222 MS
Q ONSET: 222 MS
QRS COUNT: 11 BEATS
QRS COUNT: 11 BEATS
QRS COUNT: 13 BEATS
QRS DURATION: 78 MS
QRS DURATION: 80 MS
QRS DURATION: 84 MS
QT INTERVAL: 388 MS
QT INTERVAL: 414 MS
QT INTERVAL: 426 MS
QTC CALCULATION(BAZETT): 434 MS
QTC CALCULATION(BAZETT): 443 MS
QTC CALCULATION(BAZETT): 444 MS
QTC FREDERICIA: 425 MS
QTC FREDERICIA: 427 MS
QTC FREDERICIA: 437 MS
R AXIS: -7 DEGREES
R AXIS: 6 DEGREES
R AXIS: 7 DEGREES
RBC # BLD AUTO: 3.57 X10*6/UL (ref 4–5.2)
SODIUM SERPL-SCNC: 137 MMOL/L (ref 136–145)
T AXIS: 33 DEGREES
T AXIS: 39 DEGREES
T AXIS: 55 DEGREES
T OFFSET: 414 MS
T OFFSET: 416 MS
T OFFSET: 435 MS
VENTRICULAR RATE: 65 BPM
VENTRICULAR RATE: 66 BPM
VENTRICULAR RATE: 79 BPM
WBC # BLD AUTO: 9.6 X10*3/UL (ref 4.4–11.3)

## 2024-08-13 PROCEDURE — 84484 ASSAY OF TROPONIN QUANT: CPT | Performed by: STUDENT IN AN ORGANIZED HEALTH CARE EDUCATION/TRAINING PROGRAM

## 2024-08-13 PROCEDURE — 2500000001 HC RX 250 WO HCPCS SELF ADMINISTERED DRUGS (ALT 637 FOR MEDICARE OP): Performed by: NURSE PRACTITIONER

## 2024-08-13 PROCEDURE — 93010 ELECTROCARDIOGRAM REPORT: CPT | Performed by: INTERNAL MEDICINE

## 2024-08-13 PROCEDURE — 2500000001 HC RX 250 WO HCPCS SELF ADMINISTERED DRUGS (ALT 637 FOR MEDICARE OP): Performed by: INTERNAL MEDICINE

## 2024-08-13 PROCEDURE — 93005 ELECTROCARDIOGRAM TRACING: CPT

## 2024-08-13 PROCEDURE — 2500000004 HC RX 250 GENERAL PHARMACY W/ HCPCS (ALT 636 FOR OP/ED): Performed by: STUDENT IN AN ORGANIZED HEALTH CARE EDUCATION/TRAINING PROGRAM

## 2024-08-13 PROCEDURE — 1210000001 HC SEMI-PRIVATE ROOM DAILY

## 2024-08-13 PROCEDURE — 85025 COMPLETE CBC W/AUTO DIFF WBC: CPT | Performed by: STUDENT IN AN ORGANIZED HEALTH CARE EDUCATION/TRAINING PROGRAM

## 2024-08-13 PROCEDURE — G0378 HOSPITAL OBSERVATION PER HR: HCPCS

## 2024-08-13 PROCEDURE — 99232 SBSQ HOSP IP/OBS MODERATE 35: CPT

## 2024-08-13 PROCEDURE — 36415 COLL VENOUS BLD VENIPUNCTURE: CPT | Performed by: STUDENT IN AN ORGANIZED HEALTH CARE EDUCATION/TRAINING PROGRAM

## 2024-08-13 PROCEDURE — 99233 SBSQ HOSP IP/OBS HIGH 50: CPT | Performed by: INTERNAL MEDICINE

## 2024-08-13 PROCEDURE — 2500000004 HC RX 250 GENERAL PHARMACY W/ HCPCS (ALT 636 FOR OP/ED): Performed by: INTERNAL MEDICINE

## 2024-08-13 PROCEDURE — 82565 ASSAY OF CREATININE: CPT | Performed by: STUDENT IN AN ORGANIZED HEALTH CARE EDUCATION/TRAINING PROGRAM

## 2024-08-13 PROCEDURE — 83735 ASSAY OF MAGNESIUM: CPT | Performed by: STUDENT IN AN ORGANIZED HEALTH CARE EDUCATION/TRAINING PROGRAM

## 2024-08-13 RX ORDER — METOPROLOL TARTRATE 25 MG/1
12.5 TABLET, FILM COATED ORAL 2 TIMES DAILY
Status: DISCONTINUED | OUTPATIENT
Start: 2024-08-13 | End: 2024-08-13 | Stop reason: HOSPADM

## 2024-08-13 RX ORDER — ISOSORBIDE MONONITRATE 30 MG/1
30 TABLET, EXTENDED RELEASE ORAL DAILY
Status: DISCONTINUED | OUTPATIENT
Start: 2024-08-13 | End: 2024-08-13 | Stop reason: HOSPADM

## 2024-08-13 RX ORDER — ISOSORBIDE MONONITRATE 30 MG/1
30 TABLET, EXTENDED RELEASE ORAL DAILY
Qty: 30 TABLET | Refills: 3 | Status: SHIPPED | OUTPATIENT
Start: 2024-08-13 | End: 2024-12-11

## 2024-08-13 RX ADMIN — PROMETHAZINE HYDROCHLORIDE 12.5 MG: 25 INJECTION INTRAMUSCULAR; INTRAVENOUS at 00:22

## 2024-08-13 RX ADMIN — MAGNESIUM OXIDE 400 MG (241.3 MG MAGNESIUM) TABLET 400 MG: TABLET at 09:18

## 2024-08-13 RX ADMIN — METOPROLOL TARTRATE 12.5 MG: 25 TABLET, FILM COATED ORAL at 11:14

## 2024-08-13 RX ADMIN — ISOSORBIDE MONONITRATE 30 MG: 30 TABLET, EXTENDED RELEASE ORAL at 11:14

## 2024-08-13 RX ADMIN — MORPHINE SULFATE 2 MG: 2 INJECTION, SOLUTION INTRAMUSCULAR; INTRAVENOUS at 00:21

## 2024-08-13 RX ADMIN — ASPIRIN 81 MG: 81 TABLET, COATED ORAL at 09:18

## 2024-08-13 RX ADMIN — CLOPIDOGREL BISULFATE 75 MG: 75 TABLET, FILM COATED ORAL at 09:18

## 2024-08-13 ASSESSMENT — COGNITIVE AND FUNCTIONAL STATUS - GENERAL
MOBILITY SCORE: 24
DAILY ACTIVITIY SCORE: 24

## 2024-08-13 ASSESSMENT — ACTIVITIES OF DAILY LIVING (ADL): LACK_OF_TRANSPORTATION: NO

## 2024-08-13 NOTE — PROGRESS NOTES
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 Yes   Type of Animals or Pets 1 Dog   Home or Post Acute Services None   Expected Discharge Disposition Home   Does the patient need discharge transport arranged? No   Financial Resource Strain   How hard is it for you to pay for the very basics like food, housing, medical care, and heating? Not hard   Housing Stability   In the last 12 months, was there a time when you were not able to pay the mortgage or rent on time? N   In the past 12 months, how many times have you moved where you were living? 1   At any time in the past 12 months, were you homeless or living in a shelter (including now)? N   Transportation Needs   In the past 12 months, has lack of transportation kept you from medical appointments or from getting medications? no   In the past 12 months, has lack of transportation kept you from meetings, work, or from getting things needed for daily living? No     Pt is s/p Successful PCI with 1 JESSICA proximal LAD on 8/12/24. Pt developed chest pain post procedure, Cardiology saw pt, Imdur started, to follow up with Cardiology OP. Pt states no PCP, requests and provided PCP list. [facility] [place]. Pt denies barriers to medications. Offered pt Healthy at home which pt declines at this time. Pt prefers home no needs.

## 2024-08-13 NOTE — PROGRESS NOTES
"Daily Progress Note    Fallon Stevens is a 54 y.o. female on day 0 of admission presenting with Chest pain, unspecified.    Subjective   Patient seen sitting comfortably in bed this morning.  Endorses no symptoms today.  Denies chest pain, shortness of breath, nausea, vomiting.  Patient was supposed to be discharged yesterday but developed severe chest pain postprocedure.  She was given a trial of Nitropaste which she did not tolerate.  Today, she describes a soreness in her chest but no sharp pains. Cardiology started Imdur, patient tolerated well.  EKG in NSR.       Objective   Physical Exam  Constitutional:       Appearance: Normal appearance.   HENT:      Head: Normocephalic.      Mouth/Throat:      Mouth: Mucous membranes are moist.   Eyes:      Pupils: Pupils are equal, round, and reactive to light.   Cardiovascular:      Rate and Rhythm: Normal rate and regular rhythm.      Heart sounds: Normal heart sounds, S1 normal and S2 normal.   Pulmonary:      Effort: Pulmonary effort is normal.      Breath sounds: Normal breath sounds.   Abdominal:      General: Bowel sounds are normal.      Palpations: Abdomen is soft.   Musculoskeletal:         General: Normal range of motion.      Cervical back: Neck supple.   Skin:     General: Skin is warm.   Neurological:      Mental Status: She is alert and oriented to person, place, and time.   Psychiatric:         Mood and Affect: Mood normal.         Behavior: Behavior normal.         Last Recorded Vitals  Blood pressure 112/53, pulse 83, temperature 36.2 °C (97.2 °F), temperature source Temporal, resp. rate 16, height 1.676 m (5' 6\"), weight 77.1 kg (170 lb), SpO2 94%.  Intake/Output last 3 Shifts:  I/O last 3 completed shifts:  In: 785.5 (10.2 mL/kg) [I.V.:735 (9.5 mL/kg); IV Piggyback:50.5]  Out: 1 (0 mL/kg) [Blood:1]  Weight: 77.1 kg     Medications  Scheduled medications  aspirin, 81 mg, oral, Daily  atorvastatin, 80 mg, oral, Nightly  clopidogrel, 75 mg, oral, " Daily  isosorbide mononitrate ER, 30 mg, oral, Daily  magnesium oxide, 400 mg, oral, BID  metoprolol tartrate, 12.5 mg, oral, BID  nitroglycerin, 0.5 inch, transdermal, q6h during day      Continuous medications     PRN medications  PRN medications: acetaminophen **OR** acetaminophen **OR** acetaminophen, alum-mag hydroxide-simeth, melatonin, morphine, nitroglycerin, ondansetron **OR** ondansetron, polyethylene glycol, promethazine    Labs  CBC:   Results from last 7 days   Lab Units 08/13/24 0413 08/11/24  0610 08/10/24  1622   WBC AUTO x10*3/uL 9.6 7.9 8.8   RBC AUTO x10*6/uL 3.57* 3.74* 3.56*   HEMOGLOBIN g/dL 11.6* 12.0 11.4*   HEMATOCRIT % 33.5* 36.4 34.6*   MCV fL 94 97 97   MCH pg 32.5 32.1 32.0   MCHC g/dL 34.6 33.0 32.9   RDW % 13.1 13.5 13.3   PLATELETS AUTO x10*3/uL 237 255 249     CMP:    Results from last 7 days   Lab Units 08/13/24 0413 08/12/24  0530 08/11/24  0610 08/10/24  1622   SODIUM mmol/L 137 138 139 133*   POTASSIUM mmol/L 3.6 4.1 4.2 3.6   CHLORIDE mmol/L 103 105 106 101   CO2 mmol/L 25 26 27 24   BUN mg/dL 8 11 8 12   CREATININE mg/dL 0.56 0.62 0.63 0.62   GLUCOSE mg/dL 102* 91 89 133*   PROTEIN TOTAL g/dL  --   --  6.1* 6.4   CALCIUM mg/dL 8.7 8.9 8.9 8.6   BILIRUBIN TOTAL mg/dL  --   --  0.3 0.3   ALK PHOS U/L  --   --  61 60   AST U/L  --   --  10 11   ALT U/L  --   --  9 10     BMP:    Results from last 7 days   Lab Units 08/13/24 0413 08/12/24  0530 08/11/24  0610   SODIUM mmol/L 137 138 139   POTASSIUM mmol/L 3.6 4.1 4.2   CHLORIDE mmol/L 103 105 106   CO2 mmol/L 25 26 27   BUN mg/dL 8 11 8   CREATININE mg/dL 0.56 0.62 0.63   CALCIUM mg/dL 8.7 8.9 8.9   GLUCOSE mg/dL 102* 91 89     Magnesium:  Results from last 7 days   Lab Units 08/13/24  0413 08/12/24  0530 08/10/24  1622   MAGNESIUM mg/dL 2.21 2.11 1.83     Troponin:    Results from last 7 days   Lab Units 08/13/24  0413 08/12/24  2056 08/12/24  1715   TROPHS ng/L 6,069* 3,868* 1,257*     BNP:   Results from last 7 days   Lab  Units 08/10/24  1622   BNP pg/mL 20     Relevant Results  Results from last 7 days   Lab Units 08/13/24  0413 08/12/24  0530 08/11/24  0610 08/10/24  1622   GLUCOSE mg/dL 102* 91 89 133*     Lab Results   Component Value Date    HGBA1C 5.7 (H) 08/10/2024        Assessment/Plan  Patient will be discharged home today. She is hemodynamically stable and asymptomatic.  Patient underwent cardiac catheterization yesterday, which showed 95% occlusion of the proximal LAD and a stent was placed. Patient will be discharged with DAPT (ASA and Plavix), Imdur, Lopressor, and nitroglycerin, which has been suggested by cardiology.  She will need to follow-up with Dr. Browning.       Edelmira Guthrie PA-C    Plan of care was discussed extensively with patient.  Patient verbalized understanding through teach back method.  All question and concerns addressed upon examination.    Of note, this documentation is completed using the Dragon Dictation system (voice recognition software). There may be spelling and/or grammatical errors that were not corrected prior to final submission.

## 2024-08-13 NOTE — PROGRESS NOTES
[facility] Progress Note           Rounding Cardiologist:  Dr. Tommy Carpenter/Jerry Sanchez APRN-CNP  Primary Cardiologist:     Date:  8/13/2024  Patient:  Fallon Stevens  YOB: 1970  MRN:  [id number]   Admit Date:  8/10/2024      SUBJECTIVE:    Fallon Stevens was seen and examined today at bedside.   Episode of chest pain last night that has since resolved  She denies any chest pain or shortness of breath today  Vital signs remained stable              VITALS:     Vitals:    08/12/24 2106 08/13/24 0047 08/13/24 0430 08/13/24 0752   BP: 137/65 106/53 119/58 112/53   BP Location: Left arm   Left arm   Patient Position: Lying   Lying   Pulse: 70 66 69 83   Resp: 16   16   Temp: 36.3 °C (97.3 °F) 36.1 °C (97 °F) 36.3 °C (97.3 °F) 36.2 °C (97.2 °F)   TempSrc: Temporal   Temporal   SpO2: 94% 93% 94% 94%   Weight:       Height:           Intake/Output Summary (Last 24 hours) at 8/13/2024 1025  Last data filed at 8/13/2024 0752  Gross per 24 hour   Intake 885.5 ml   Output --   Net 885.5 ml       Wt Readings from Last 4 Encounters:   08/10/24 77.1 kg (170 lb)       CURRENT MEDICATIONS:   aspirin, 81 mg, oral, Daily  atorvastatin, 80 mg, oral, Nightly  clopidogrel, 75 mg, oral, Daily  isosorbide mononitrate ER, 30 mg, oral, Daily  magnesium oxide, 400 mg, oral, BID  metoprolol tartrate, 12.5 mg, oral, BID  nitroglycerin, 0.5 inch, transdermal, q6h during day         Current Outpatient Medications   Medication Instructions    aspirin 81 mg, oral, Daily    atorvastatin (LIPITOR) 80 mg, oral, Daily    clopidogrel (PLAVIX) 75 mg, oral, Daily    isosorbide mononitrate ER (IMDUR) 30 mg, oral, Daily, Do not crush or chew.    nitroglycerin (NITROSTAT) 0.4 mg, sublingual, Every 5 min PRN        PHYSICAL EXAMINATION:   GENERAL:  Well developed, well nourished, in no acute distress.  CHEST:  Symmetric and nontender.  NEURO/PSYCH:  Alert and oriented times three with approppriate behavior and responses.  NECK:   "Supple, no JVD, no bruit.  LUNGS:  Clear to auscultation bilaterally, normal respiratory effort.  HEART:  Rate and rhythm regular with no evident murmur, no gallop appreciated.        There are no rubs, clicks or heaves.  EXTREMITIES:  Warm with good color, no clubbing or cyanosis.  There is no edema noted.  PERIPHERAL VASCULAR:  Pulses present and equally palpable; 2+ throughout.      LAB DATA:     CBC:   Results from last 7 days   Lab Units 08/13/24  0413   WBC AUTO x10*3/uL 9.6   RBC AUTO x10*6/uL 3.57*   HEMOGLOBIN g/dL 11.6*   HEMATOCRIT % 33.5*   PLATELETS AUTO x10*3/uL 237        CMP:    Results from last 7 days   Lab Units 08/13/24  0413   SODIUM mmol/L 137   POTASSIUM mmol/L 3.6   CHLORIDE mmol/L 103   CO2 mmol/L 25   BUN mg/dL 8   CREATININE mg/dL 0.56   GLUCOSE mg/dL 102*   CALCIUM mg/dL 8.7       Cardiac Enzymes:    Lab Results   Component Value Date    TROPHS 6,069 () 08/13/2024    TROPHS 3,868 () 08/12/2024    TROPHS 1,257 () 08/12/2024       Magnesium:    Lab Results   Component Value Date    MG 2.21 08/13/2024       Lipid Profile:  No results found for: \"CHLPL\", \"TRIG\", \"HDL\", \"LDLCALC\", \"LDLDIRECT\"    TSH:  No results found for: \"TSH\"    BNP:   Lab Results   Component Value Date    BNP 20 08/10/2024        PT/INR:  No results found for: \"PROTIME\", \"INR\"    HgBA1c:    Lab Results   Component Value Date    HGBA1C 5.7 (H) 08/10/2024       CBC:  Lab Results   Component Value Date    WBC 9.6 08/13/2024    WBC 7.9 08/11/2024    WBC 8.8 08/10/2024    RBC 3.57 (L) 08/13/2024    RBC 3.74 (L) 08/11/2024    RBC 3.56 (L) 08/10/2024    HGB 11.6 (L) 08/13/2024    HGB 12.0 08/11/2024    HGB 11.4 (L) 08/10/2024    HCT 33.5 (L) 08/13/2024    HCT 36.4 08/11/2024    HCT 34.6 (L) 08/10/2024    MCV 94 08/13/2024    MCV 97 08/11/2024    MCV 97 08/10/2024    MCH 32.5 08/13/2024    MCH 32.1 08/11/2024    MCH 32.0 08/10/2024    MCHC 34.6 08/13/2024    MCHC 33.0 08/11/2024    MCHC 32.9 08/10/2024    RDW 13.1 08/13/2024 "    RDW 13.5 08/11/2024    RDW 13.3 08/10/2024     08/13/2024     08/11/2024     08/10/2024       BMP:  Lab Results   Component Value Date     08/13/2024     08/12/2024     08/11/2024    K 3.6 08/13/2024    K 4.1 08/12/2024    K 4.2 08/11/2024     08/13/2024     08/12/2024     08/11/2024    CO2 25 08/13/2024    CO2 26 08/12/2024    CO2 27 08/11/2024    BUN 8 08/13/2024    BUN 11 08/12/2024    BUN 8 08/11/2024    CREATININE 0.56 08/13/2024    CREATININE 0.62 08/12/2024    CREATININE 0.63 08/11/2024       Hepatic Function Panel:    Lab Results   Component Value Date    ALKPHOS 61 08/11/2024    ALT 9 08/11/2024    AST 10 08/11/2024    PROT 6.1 (L) 08/11/2024    BILITOT 0.3 08/11/2024       DIAGNOSTIC TESTING:     ECG 12 Lead    Result Date: 8/11/2024  Normal sinus rhythm Normal ECG No previous ECGs available See ED provider note for full interpretation and clinical correlation Confirmed by Abigail Torres ([id number]) on 8/11/2024 5:36:17 PM    ECG 12 lead    Result Date: 8/11/2024  Normal sinus rhythm Normal ECG When compared with ECG of 10-AUG-2024 16:12, (unconfirmed) No significant change was found    XR chest 1 view    Result Date: 8/10/2024  Interpreted By:  Eunice Bean, STUDY: XR CHEST 1 VIEW;  8/10/2024 4:26 pm   INDICATION: Signs/Symptoms:Chest Pain.   COMPARISON: None.   ACCESSION NUMBER(S): [id number]   ORDERING CLINICIAN: KAITLYNN SEVILLA   FINDINGS: CARDIOMEDIASTINAL SILHOUETTE: Cardiomediastinal silhouette is normal in size and configuration.     LUNGS: Lungs are clear.   ABDOMEN: No remarkable upper abdominal findings.     BONES: No acute osseous changes. Status post ventral fusion lower cervical spine.       No acute cardiopulmonary process.   MACRO: None   Signed by: Eunice Bean 8/10/2024 4:49 PM Dictation workstation:   [id number]       RADIOLOGY:     Cardiac Catheterization Procedure   Final Result      XR chest 1 view   Final Result   No  acute cardiopulmonary process.        MACRO:   None        Signed by: Eunice Vikas 8/10/2024 4:49 PM   Dictation workstation:   [id number]          PROBLEM LIST     Patient Active Problem List   Diagnosis    Chest pain, unspecified    Mixed hyperlipidemia    Paroxysmal SVT (supraventricular tachycardia) (CMS-HCC)    Coronary artery disease of native artery of native heart with stable angina pectoris (CMS-HCC)    Anxiety    Congestive heart failure (Multi)    Degenerative lumbar spinal stenosis    Cervical discogenic pain syndrome    Gastroesophageal reflux disease    Seasonal allergic rhinitis    Chest pain, unspecified type       ASSESSMENT:         54-year-old female seen and evaluated at the bedside in the telemetry unit.     Bedside examination evaluation and interview were performed by me.     Chart reviewed in detail discussed the patient at length.     Impression:  ASHD class II  Unstable angina  Known coronary disease with prior PCI and stenting on 2 occasions presumably LAD but details not readily available from other institution  GERD  Hyperlipidemia  Anxiety depression  Cervical spine disease and prior surgery  PLAN:   Recommendation:  Remains on telemetry.  Telemetry shows normal sinus rhythm  Underwent LHC with Dr. Barragan.  Findings of LHC revealed 90% in-stent restenosis in the proximal LAD with diffuse disease throughout the remaining vessel, mild disease in the circumflex artery and RCA with an LVEF of 60%.  Successful PCI with 1 JESSICA placed to the proximal LAD.  Continue DAPT.  No beta-blocker due to hypotension and bradycardia.  Add Imdur  Follow-up arranged in the office  Okay to discharge from general cardiology  Follow-up EKG and possible echo in the office when she arrives there.    Tommy CarpenterDO,FACC      [facility]  Adult Gerontology Acute Care Nurse Practitioner  [facility] [place]   [facility]  [place]  [phone]          Please do not hesitate to call with questions.  Electronically signed by Tommy Carpenter DO, on 8/13/2024 at 10:25 AM

## 2024-08-14 ENCOUNTER — PATIENT OUTREACH (OUTPATIENT)
Dept: CARDIOLOGY | Facility: CLINIC | Age: 54
End: 2024-08-14
Payer: MEDICAID

## 2024-08-14 NOTE — PROGRESS NOTES
Discharge Facility:  [facility] [place], [place]  Discharge Diagnosis:  CP  Admission Date:  8/10/24  Discharge Date:  8/13/24    PCP Appointment Date no appt listed  Specialist Appointment Date:   AUG 26  2024  12:30 PM - Cardiology Hospital Discharge  [facility] - Tommy Carpenter DO     Hospital Encounter and Summary Linked: Yes    Two attempts were made to reach patient within two business days after discharge. Voicemail left with contact information for patient to call back with any non-emergent questions or concerns.

## 2024-08-15 NOTE — DOCUMENTATION CLARIFICATION NOTE
"    PATIENT:               FATEMEH COOK  ACCT #:                  [id number]  MRN:                       [id number]  :                       1970  ADMIT DATE:       8/10/2024 4:08 PM  DISCH DATE:        2024 12:56 PM  RESPONDING PROVIDER #:        [id number]          PROVIDER RESPONSE TEXT:    No CHF    CDI QUERY TEXT:    Clarification    Instruction:    Based on your assessment of the patient and the clinical information, please provide the requested documentation by clicking on the appropriate radio button and enter any additional information if prompted.    Question: Please further clarify the type and acuity of congestive heart failure    When answering this query, please exercise your independent professional judgment. The fact that a question is being asked, does not imply that any particular answer is desired or expected.    The patient's clinical indicators include:  Clinical Information: 55 yo female admitted with chest pain s/p JESSICA of proximal LAD    Clinical Indicators:  BNP: 20 (8/10)     PN Dr. Carpenter \"Active Problems List: CHF  Impression: ASHD class II\"     DCN Edelmira Guthrie PA: \"PMHx including CAD s/p stent placement, HLD, PSVT\"    8/10 ED VALE Yousif NP: \"BNP was normal without evidence of heart failure\"    Treatment: ASA, statin, nitro, plavix, no lasix PO or IV    Risk Factors: CAD  Options provided:  -- Chronic Diastolic Congestive Heart Failure  -- No CHF  -- Other - I will add my own diagnosis  -- Refer to Clinical Documentation Reviewer    Query created by: Reta Mckeon on 2024 12:00 PM      Electronically signed by:  GREG HOLLAND MD 8/15/2024 4:20 PM          "

## 2024-08-26 ENCOUNTER — APPOINTMENT (OUTPATIENT)
Dept: CARDIOLOGY | Facility: CLINIC | Age: 54
End: 2024-08-26
Payer: MEDICAID

## 2024-08-29 ENCOUNTER — PATIENT OUTREACH (OUTPATIENT)
Dept: CARDIOLOGY | Facility: CLINIC | Age: 54
End: 2024-08-29
Payer: MEDICAID

## 2024-08-29 NOTE — PROGRESS NOTES
Unable to reach patient for call back after patient's follow up appointment with PCP.   YUNIM with call back number for patient to call if needed   If no voicemail available call attempts x 2 were made to contact the patient to assist with any questions or concerns patient may have.

## 2025-05-21 ENCOUNTER — APPOINTMENT (OUTPATIENT)
Dept: RADIOLOGY | Facility: HOSPITAL | Age: 55
End: 2025-05-21
Payer: MEDICAID

## 2025-05-21 ENCOUNTER — APPOINTMENT (OUTPATIENT)
Dept: CARDIOLOGY | Facility: HOSPITAL | Age: 55
End: 2025-05-21
Payer: MEDICAID

## 2025-05-21 ENCOUNTER — HOSPITAL ENCOUNTER (INPATIENT)
Facility: HOSPITAL | Age: 55
LOS: 1 days | Discharge: HOME | End: 2025-05-23
Attending: EMERGENCY MEDICINE | Admitting: HOSPITALIST
Payer: MEDICAID

## 2025-05-21 DIAGNOSIS — Z98.61 CORONARY ARTERY DISEASE WITH HISTORY OF PERCUTANEOUS TRANSLUMINAL ANGIOPLASTY (PTCA): ICD-10-CM

## 2025-05-21 DIAGNOSIS — I20.9 ANGINA PECTORIS, UNSPECIFIED: ICD-10-CM

## 2025-05-21 DIAGNOSIS — E78.2 MIXED HYPERLIPIDEMIA: ICD-10-CM

## 2025-05-21 DIAGNOSIS — I25.118 CORONARY ARTERY DISEASE OF NATIVE ARTERY OF NATIVE HEART WITH STABLE ANGINA PECTORIS: ICD-10-CM

## 2025-05-21 DIAGNOSIS — Z98.61 POST PTCA: ICD-10-CM

## 2025-05-21 DIAGNOSIS — R07.9 CHEST PAIN, UNSPECIFIED TYPE: Primary | ICD-10-CM

## 2025-05-21 DIAGNOSIS — I25.10 CORONARY ARTERY DISEASE WITH HISTORY OF PERCUTANEOUS TRANSLUMINAL ANGIOPLASTY (PTCA): ICD-10-CM

## 2025-05-21 LAB
ALBUMIN SERPL BCP-MCNC: 3.8 G/DL (ref 3.4–5)
ALP SERPL-CCNC: 74 U/L (ref 33–110)
ALT SERPL W P-5'-P-CCNC: 8 U/L (ref 7–45)
ANION GAP SERPL CALC-SCNC: 12 MMOL/L (ref 10–20)
AST SERPL W P-5'-P-CCNC: 11 U/L (ref 9–39)
BASOPHILS # BLD AUTO: 0.04 X10*3/UL (ref 0–0.1)
BASOPHILS NFR BLD AUTO: 0.4 %
BILIRUB SERPL-MCNC: 0.3 MG/DL (ref 0–1.2)
BNP SERPL-MCNC: 57 PG/ML (ref 0–99)
BUN SERPL-MCNC: 14 MG/DL (ref 6–23)
CALCIUM SERPL-MCNC: 8.9 MG/DL (ref 8.6–10.3)
CARDIAC TROPONIN I PNL SERPL HS: 3 NG/L (ref 0–13)
CARDIAC TROPONIN I PNL SERPL HS: 6 NG/L (ref 0–13)
CHLORIDE SERPL-SCNC: 106 MMOL/L (ref 98–107)
CO2 SERPL-SCNC: 23 MMOL/L (ref 21–32)
CREAT SERPL-MCNC: 0.77 MG/DL (ref 0.5–1.05)
D DIMER PPP FEU-MCNC: 627 NG/ML FEU
EGFRCR SERPLBLD CKD-EPI 2021: >90 ML/MIN/1.73M*2
EOSINOPHIL # BLD AUTO: 0.28 X10*3/UL (ref 0–0.7)
EOSINOPHIL NFR BLD AUTO: 2.6 %
ERYTHROCYTE [DISTWIDTH] IN BLOOD BY AUTOMATED COUNT: 13.2 % (ref 11.5–14.5)
GLUCOSE SERPL-MCNC: 128 MG/DL (ref 74–99)
HCT VFR BLD AUTO: 37.8 % (ref 36–46)
HGB BLD-MCNC: 12.3 G/DL (ref 12–16)
IMM GRANULOCYTES # BLD AUTO: 0.05 X10*3/UL (ref 0–0.7)
IMM GRANULOCYTES NFR BLD AUTO: 0.5 % (ref 0–0.9)
INR PPP: 1 (ref 0.9–1.1)
LACTATE SERPL-SCNC: 1.4 MMOL/L (ref 0.4–2)
LYMPHOCYTES # BLD AUTO: 2.67 X10*3/UL (ref 1.2–4.8)
LYMPHOCYTES NFR BLD AUTO: 24.7 %
MAGNESIUM SERPL-MCNC: 2.07 MG/DL (ref 1.6–2.4)
MCH RBC QN AUTO: 31.2 PG (ref 26–34)
MCHC RBC AUTO-ENTMCNC: 32.5 G/DL (ref 32–36)
MCV RBC AUTO: 96 FL (ref 80–100)
MONOCYTES # BLD AUTO: 0.45 X10*3/UL (ref 0.1–1)
MONOCYTES NFR BLD AUTO: 4.2 %
NEUTROPHILS # BLD AUTO: 7.34 X10*3/UL (ref 1.2–7.7)
NEUTROPHILS NFR BLD AUTO: 67.6 %
NRBC BLD-RTO: 0 /100 WBCS (ref 0–0)
PLATELET # BLD AUTO: 342 X10*3/UL (ref 150–450)
POTASSIUM SERPL-SCNC: 3.9 MMOL/L (ref 3.5–5.3)
PROT SERPL-MCNC: 6.6 G/DL (ref 6.4–8.2)
PROTHROMBIN TIME: 10.6 SECONDS (ref 9.8–12.4)
RBC # BLD AUTO: 3.94 X10*6/UL (ref 4–5.2)
SODIUM SERPL-SCNC: 137 MMOL/L (ref 136–145)
WBC # BLD AUTO: 10.8 X10*3/UL (ref 4.4–11.3)

## 2025-05-21 PROCEDURE — 2500000001 HC RX 250 WO HCPCS SELF ADMINISTERED DRUGS (ALT 637 FOR MEDICARE OP): Performed by: EMERGENCY MEDICINE

## 2025-05-21 PROCEDURE — 36415 COLL VENOUS BLD VENIPUNCTURE: CPT | Performed by: EMERGENCY MEDICINE

## 2025-05-21 PROCEDURE — 93005 ELECTROCARDIOGRAM TRACING: CPT

## 2025-05-21 PROCEDURE — 84484 ASSAY OF TROPONIN QUANT: CPT | Performed by: EMERGENCY MEDICINE

## 2025-05-21 PROCEDURE — 85610 PROTHROMBIN TIME: CPT | Performed by: EMERGENCY MEDICINE

## 2025-05-21 PROCEDURE — 83735 ASSAY OF MAGNESIUM: CPT | Performed by: EMERGENCY MEDICINE

## 2025-05-21 PROCEDURE — 80053 COMPREHEN METABOLIC PANEL: CPT | Performed by: EMERGENCY MEDICINE

## 2025-05-21 PROCEDURE — 83880 ASSAY OF NATRIURETIC PEPTIDE: CPT | Performed by: EMERGENCY MEDICINE

## 2025-05-21 PROCEDURE — 85379 FIBRIN DEGRADATION QUANT: CPT | Performed by: EMERGENCY MEDICINE

## 2025-05-21 PROCEDURE — 71275 CT ANGIOGRAPHY CHEST: CPT | Performed by: RADIOLOGY

## 2025-05-21 PROCEDURE — 85025 COMPLETE CBC W/AUTO DIFF WBC: CPT | Performed by: EMERGENCY MEDICINE

## 2025-05-21 PROCEDURE — 83605 ASSAY OF LACTIC ACID: CPT | Performed by: EMERGENCY MEDICINE

## 2025-05-21 PROCEDURE — 99223 1ST HOSP IP/OBS HIGH 75: CPT | Performed by: STUDENT IN AN ORGANIZED HEALTH CARE EDUCATION/TRAINING PROGRAM

## 2025-05-21 PROCEDURE — 2500000004 HC RX 250 GENERAL PHARMACY W/ HCPCS (ALT 636 FOR OP/ED): Mod: JZ | Performed by: EMERGENCY MEDICINE

## 2025-05-21 PROCEDURE — G0378 HOSPITAL OBSERVATION PER HR: HCPCS

## 2025-05-21 PROCEDURE — 71045 X-RAY EXAM CHEST 1 VIEW: CPT

## 2025-05-21 PROCEDURE — 99285 EMERGENCY DEPT VISIT HI MDM: CPT | Mod: 25 | Performed by: EMERGENCY MEDICINE

## 2025-05-21 PROCEDURE — 71275 CT ANGIOGRAPHY CHEST: CPT

## 2025-05-21 PROCEDURE — 2550000001 HC RX 255 CONTRASTS: Performed by: EMERGENCY MEDICINE

## 2025-05-21 PROCEDURE — 71045 X-RAY EXAM CHEST 1 VIEW: CPT | Performed by: RADIOLOGY

## 2025-05-21 PROCEDURE — 96361 HYDRATE IV INFUSION ADD-ON: CPT

## 2025-05-21 PROCEDURE — 96375 TX/PRO/DX INJ NEW DRUG ADDON: CPT

## 2025-05-21 PROCEDURE — 96374 THER/PROPH/DIAG INJ IV PUSH: CPT

## 2025-05-21 RX ORDER — KETOROLAC TROMETHAMINE 30 MG/ML
15 INJECTION, SOLUTION INTRAMUSCULAR; INTRAVENOUS ONCE
Status: COMPLETED | OUTPATIENT
Start: 2025-05-21 | End: 2025-05-21

## 2025-05-21 RX ORDER — ASPIRIN 325 MG
325 TABLET ORAL ONCE
Status: COMPLETED | OUTPATIENT
Start: 2025-05-21 | End: 2025-05-21

## 2025-05-21 RX ORDER — MORPHINE SULFATE 2 MG/ML
2 INJECTION, SOLUTION INTRAMUSCULAR; INTRAVENOUS ONCE
Status: COMPLETED | OUTPATIENT
Start: 2025-05-21 | End: 2025-05-21

## 2025-05-21 RX ORDER — NITROGLYCERIN 0.4 MG/1
0.4 TABLET SUBLINGUAL ONCE
Status: DISCONTINUED | OUTPATIENT
Start: 2025-05-21 | End: 2025-05-22

## 2025-05-21 RX ADMIN — ASPIRIN 325 MG: 325 TABLET ORAL at 19:08

## 2025-05-21 RX ADMIN — IOHEXOL 75 ML: 350 INJECTION, SOLUTION INTRAVENOUS at 19:45

## 2025-05-21 RX ADMIN — MORPHINE SULFATE 2 MG: 2 INJECTION, SOLUTION INTRAMUSCULAR; INTRAVENOUS at 21:31

## 2025-05-21 RX ADMIN — SODIUM CHLORIDE, SODIUM LACTATE, POTASSIUM CHLORIDE, AND CALCIUM CHLORIDE 500 ML: .6; .31; .03; .02 INJECTION, SOLUTION INTRAVENOUS at 20:16

## 2025-05-21 RX ADMIN — SODIUM CHLORIDE, SODIUM LACTATE, POTASSIUM CHLORIDE, AND CALCIUM CHLORIDE 1000 ML: .6; .31; .03; .02 INJECTION, SOLUTION INTRAVENOUS at 21:32

## 2025-05-21 RX ADMIN — KETOROLAC TROMETHAMINE 15 MG: 30 INJECTION, SOLUTION INTRAMUSCULAR at 20:16

## 2025-05-21 ASSESSMENT — PAIN DESCRIPTION - ORIENTATION
ORIENTATION: MID
ORIENTATION: MID

## 2025-05-21 ASSESSMENT — PAIN SCALES - GENERAL
PAINLEVEL_OUTOF10: 7
PAINLEVEL_OUTOF10: 8
PAINLEVEL_OUTOF10: 8
PAINLEVEL_OUTOF10: 3
PAINLEVEL_OUTOF10: 8
PAINLEVEL_OUTOF10: 7

## 2025-05-21 ASSESSMENT — PAIN DESCRIPTION - PAIN TYPE
TYPE: ACUTE PAIN
TYPE: ACUTE PAIN

## 2025-05-21 ASSESSMENT — HEART SCORE
AGE: 45-64
RISK FACTORS: >2 RISK FACTORS OR HX OF ATHEROSCLEROTIC DISEASE
HEART SCORE: 5
ECG: NON-SPECIFIC REPOLARIZATION DISTURBANCE
HISTORY: MODERATELY SUSPICIOUS
TROPONIN: LESS THAN OR EQUAL TO NORMAL LIMIT

## 2025-05-21 ASSESSMENT — PAIN DESCRIPTION - LOCATION
LOCATION: CHEST
LOCATION: CHEST

## 2025-05-21 ASSESSMENT — PAIN - FUNCTIONAL ASSESSMENT
PAIN_FUNCTIONAL_ASSESSMENT: 0-10
PAIN_FUNCTIONAL_ASSESSMENT: 0-10

## 2025-05-21 ASSESSMENT — PAIN DESCRIPTION - DESCRIPTORS
DESCRIPTORS: SHARP

## 2025-05-21 ASSESSMENT — COLUMBIA-SUICIDE SEVERITY RATING SCALE - C-SSRS
1. IN THE PAST MONTH, HAVE YOU WISHED YOU WERE DEAD OR WISHED YOU COULD GO TO SLEEP AND NOT WAKE UP?: NO
6. HAVE YOU EVER DONE ANYTHING, STARTED TO DO ANYTHING, OR PREPARED TO DO ANYTHING TO END YOUR LIFE?: NO
2. HAVE YOU ACTUALLY HAD ANY THOUGHTS OF KILLING YOURSELF?: NO

## 2025-05-21 ASSESSMENT — PAIN DESCRIPTION - PROGRESSION: CLINICAL_PROGRESSION: NOT CHANGED

## 2025-05-21 NOTE — Clinical Note
Angioplasty of the left anterior descending lesion. Inflation 1: Pressure = 12 star; Duration = 20 sec. Inflation 2: Pressure = 10 star; Duration = 20 sec.

## 2025-05-21 NOTE — Clinical Note
Angioplasty of the left anterior descending lesion. Inflation 1: Pressure = 12 star; Duration = 10 sec. Inflation 2: Pressure = 16 star; Duration = 5 sec.

## 2025-05-21 NOTE — ED PROVIDER NOTES
HPI   Chief Complaint   Patient presents with    Chest Pain     Mid sternal non radiating chest pain. Took 3 nitroglycerin at home. Hx of MI and 4 cardiac caths       HPI: 54-year-old female history of coronary artery disease states that she had a stent put in her LAD previously and is on Plavix states that she was sitting when she developed substernal chest pain.  The pain was nonradiating.  No jaw pain.  No dizziness or diaphoresis.  No nausea.  No swelling in her legs.  No history of DVT or PE.  She states that after she had the stent put in she was post to follow-up with a cardiologist which she has not.  She does take Lipitor.    Family HX: Denies any significant/pertinent family history.  Social Hx: Denies ETOH or drug use.  Review of systems:  Gen.: No weight loss, fatigue, anorexia, insomnia, fever.   Eyes: No vision loss, double vision, drainage, eye pain.   ENT: No pharyngitis, dry mouth.   Cardiac: No chest pain, palpitations, syncope, near syncope.   Pulmonary: No shortness of breath, cough, hemoptysis.   Heme/lymph: No swollen glands, fever, bleeding.   GI: No abdominal pain, change in bowel habits, melena, hematemesis, hematochezia, nausea, vomiting, diarrhea.   : No discharge, dysuria, frequency, urgency, hematuria.   Musculoskeletal: No limb pain, joint pain, joint swelling.   Skin: No rashes.   Psych: No depression, anxiety, suicidality, homicidality.   Review of systems is otherwise negative unless stated above or in history of present illness.    Physical Exam:    Appearance: Alert, oriented , cooperative,  in no acute distress. Well nourished & well hydrated.    Skin: Intact,  dry skin, no lesions, rash, petechiae or purpura.     Eyes: PERRLA, EOMs intact,  Conjunctiva pink with no redness or exudates. Eyelids without lesions. No scleral icterus.     ENT: Hearing grossly intact. External auditory canals patent, tympanic membranes intact with visible landmarks. Nares patent, mucus membranes  moist. Dentition without lesions. Pharynx clear, uvula midline.     Neck: Supple, without meningismus. Thyroid not palpable. Trachea at midline. No lymphadenopathy.    Pulmonary: Clear bilaterally with good chest wall excursion. No rales, rhonchi or wheezing. No accessory muscle use or stridor.    Cardiac: Normal S1, S2 without murmur, rub, gallop or extrasystole. No JVD, Carotids without bruits.    Abdomen: Soft, nontender, active bowel sounds.  No palpable organomegaly.  No rebound or guarding.  No CVA tenderness.    Genitourinary: Exam deferred.    Musculoskeletal: Full range of motion. no pain, edema, or deformity. Pulses full and equal. No cyanosis, clubbing, or edema.    Neurological:  Cranial nerves II through XII are grossly intact, finger-nose touch is normal, normal sensation, no weakness, no focal findings identified.    Psychiatric: Appropriate mood and affect.     Medical Decision-Making:  Testing:   Treatment: EKG was obtained which is interpreted by me shows a sinus rhythm rate of 79 without evidence of obvious ST elevations or T wave inversions indicate acute ischemia or infarct.  Repeat EKG shows a sinus rhythm interpreted by me rate of 67 without evidence of obvious ST elevations or T wave inversions indicate acute ischemia or infarct.  Troponin is negative x 2 patient given aspirin and nitroglycerin.  We did do labs.  Initial troponin is negative.  She has a normal BNP and normal magnesium.  She has a glucose of 128 without evidence of DKA.  She has otherwise normal electrolytes.  She has a normal lactate.  She does have an elevated dimer at 627.  This prompted a CT of the chest.  INR is normal.  She has a white count of 10.8 without a leukocytosis and hemoglobin of 12.3.  Chest x-ray was obtained which is interpreted by radiology shows no acute cardiopulmonary process.  Patient does have an elevated heart score of 5.  Given that the patient has an elevated heart score and has a known stent to the  LAD I did feel that the patient would benefit from admission.  Patient was accepted by the hospitalist  Reevaluation:   Plan: Admit Patient and family/friend/caregiver are in agreement with this plan.   Impression:   1.  Chest pain  2.    Labs Reviewed  CBC WITH AUTO DIFFERENTIAL - Abnormal     WBC                           10.8                   nRBC                          0.0                    RBC                           3.94 (*)               Hemoglobin                    12.3                   Hematocrit                    37.8                   MCV                           96                     MCH                           31.2                   MCHC                          32.5                   RDW                           13.2                   Platelets                     342                    Neutrophils %                 67.6                   Immature Granulocytes %, Automated   0.5                    Lymphocytes %                 24.7                   Monocytes %                   4.2                    Eosinophils %                 2.6                    Basophils %                   0.4                    Neutrophils Absolute          7.34                   Immature Granulocytes Absolute, Au*   0.05                   Lymphocytes Absolute          2.67                   Monocytes Absolute            0.45                   Eosinophils Absolute          0.28                   Basophils Absolute            0.04                COMPREHENSIVE METABOLIC PANEL - Abnormal     Glucose                       128 (*)                Sodium                        137                    Potassium                     3.9                    Chloride                      106                    Bicarbonate                   23                     Anion Gap                     12                     Urea Nitrogen                 14                     Creatinine                    0.77                   eGFR                           >90                    Calcium                       8.9                    Albumin                       3.8                    Alkaline Phosphatase          74                     Total Protein                 6.6                    AST                           11                     Bilirubin, Total              0.3                    ALT                           8                   D-DIMER, VTE EXCLUSION - Abnormal     D-Dimer, Quantitative VTE Exclusion   627 (*)                  Narrative: The VTE Exclusion D-Dimer assay is reported in ng/mL Fibrinogen Equivalent Units (FEU).                                Per 's instructions for use, a value of less than 500 ng/mL (FEU) may help to exclude DVT or PE in outpatients when the assay is used with a clinical pretest probability assessment.(AE must utilize and document eCalc 'Wells Score Deep Vein Thrombosis Risk' for DVT exclusion only. Emergency Department should utilize  Guidelines for Emergency Department Use of the VTE Exclusion D-Dimer and Clinical Pretest probability assessment model for DVT or PE exclusion.)  MAGNESIUM - Normal     Magnesium                     2.07                LACTATE - Normal     Lactate                       1.4                      Narrative: Venipuncture immediately after or during the administration of Metamizole may lead to falsely low results. Testing should be performed immediately prior to Metamizole dosing.  PROTIME-INR - Normal     Protime                       10.6                   INR                           1.0                 B-TYPE NATRIURETIC PEPTIDE - Normal     BNP                           57                       Narrative:    <100 pg/mL - Heart failure unlikely                100-299 pg/mL - Intermediate probability of acute heart                                failure exacerbation. Correlate with clinical                                context and patient history.                   >=300 pg/mL - Heart Failure likely. Correlate with clinical                                context and patient history.                                BNP testing is performed using different testing methodology at [facility] than at other [facility] hospitals. Direct result comparisons should only be made within the same method.                   SERIAL TROPONIN-INITIAL - Normal     Troponin I, High Sensitivity   3                        Narrative: Less than 99th percentile of normal range cutoff-                Female and children under 18 years old <14 ng/L; Male <21 ng/L: Negative                Repeat testing should be performed if clinically indicated.                                 Female and children under 18 years old 14-50 ng/L; Male 21-50 ng/L:                Consistent with possible cardiac damage and possible increased clinical                 risk. Serial measurements may help to assess extent of myocardial damage.                                 >50 ng/L: Consistent with cardiac damage, increased clinical risk and                myocardial infarction. Serial measurements may help assess extent of                 myocardial damage.                                  NOTE: Children less than 1 year old may have higher baseline troponin                 levels and results should be interpreted in conjunction with the overall                 clinical context.                                 NOTE: Troponin I testing is performed using a different                 testing methodology at [facility] than at other                 [facility]. Direct result comparisons should only                 be made within the same method.  SERIAL TROPONIN, 1 HOUR - Normal     Troponin I, High Sensitivity   6                        Narrative: Less than 99th percentile of normal range cutoff-                Female and children under 18 years old <14 ng/L; Male <21 ng/L: Negative                 Repeat testing should be performed if clinically indicated.                                 Female and children under 18 years old 14-50 ng/L; Male 21-50 ng/L:                Consistent with possible cardiac damage and possible increased clinical                 risk. Serial measurements may help to assess extent of myocardial damage.                                 >50 ng/L: Consistent with cardiac damage, increased clinical risk and                myocardial infarction. Serial measurements may help assess extent of                 myocardial damage.                                  NOTE: Children less than 1 year old may have higher baseline troponin                 levels and results should be interpreted in conjunction with the overall                 clinical context.                                 NOTE: Troponin I testing is performed using a different                 testing methodology at [facility] than at other                 [facility]. Direct result comparisons should only                 be made within the same method.  TROPONIN SERIES- (INITIAL, 1 HR)     CT angio chest for pulmonary embolism   Final Result    1. No evidence of acute pulmonary embolism.    2.          MACRO:    None.          Signed by: Timmy Galeano 5/21/2025 8:15 PM    Dictation workstation:   [id number]     XR chest 1 view   Final Result    1.  No evidence of acute cardiopulmonary process.                      MACRO:    None          Signed by: Leatha Sosa 5/21/2025 6:53 PM    Dictation workstation:   [id number]                    Patient History   Medical History[1]  Surgical History[2]  Family History[3]  Social History[4]    Physical Exam   ED Triage Vitals [05/21/25 1806]   Temperature Heart Rate Respirations BP   36.4 °C (97.5 °F) 67 18 99/55      Pulse Ox Temp Source Heart Rate Source Patient Position   97 % Temporal Monitor Sitting      BP Location FiO2 (%)     Right arm --       Physical  Exam      ED Course & MDM   Diagnoses as of 25   Chest pain, unspecified type                 No data recorded     [place] Coma Scale Score: 15 (25 1806 : Briana Robertson RN) HEART Score: 5 (25 1841 : Sobia Villaseñor MD)                         Medical Decision Making      Procedure  Procedures         [1] History reviewed. No pertinent past medical history.  [2]   Past Surgical History:  Procedure Laterality Date    CARDIAC CATHETERIZATION N/A 2024    Procedure: Left Heart Cath;  Surgeon: Luis Barragan MD;  Location: [facility];  Service: Cardiovascular;  Laterality: N/A;    CARDIAC CATHETERIZATION N/A 2024    Procedure: PCI JESSICA Stent- Coronary;  Surgeon: Luis Barragan MD;  Location: [facility];  Service: Cardiovascular;  Laterality: N/A;   [3] No family history on file.  [4]   Social History  Tobacco Use    Smoking status: Former     Current packs/day: 0.00     Types: Cigarettes     Quit date:      Years since quittin.4    Smokeless tobacco: Never   Substance Use Topics    Alcohol use: Not on file    Drug use: Not on file        Sobia Villaseñor MD  25

## 2025-05-22 ENCOUNTER — APPOINTMENT (OUTPATIENT)
Dept: CARDIOLOGY | Facility: HOSPITAL | Age: 55
End: 2025-05-22
Payer: MEDICAID

## 2025-05-22 ENCOUNTER — SURGERY (OUTPATIENT)
Age: 55
End: 2025-05-22
Payer: MEDICAID

## 2025-05-22 PROBLEM — T82.858A RESTENOSIS OF ARTERIAL STENT: Status: ACTIVE | Noted: 2025-05-22

## 2025-05-22 PROBLEM — I25.10 CORONARY ARTERY DISEASE WITH HISTORY OF PERCUTANEOUS TRANSLUMINAL ANGIOPLASTY (PTCA): Status: ACTIVE | Noted: 2025-05-22

## 2025-05-22 PROBLEM — Z98.61 CORONARY ARTERY DISEASE WITH HISTORY OF PERCUTANEOUS TRANSLUMINAL ANGIOPLASTY (PTCA): Status: ACTIVE | Noted: 2025-05-22

## 2025-05-22 LAB
ACT BLD: >397 SEC (ref 83–199)
ANION GAP SERPL CALC-SCNC: 11 MMOL/L (ref 10–20)
ATRIAL RATE: 56 BPM
ATRIAL RATE: 72 BPM
BUN SERPL-MCNC: 11 MG/DL (ref 6–23)
CALCIUM SERPL-MCNC: 8.7 MG/DL (ref 8.6–10.3)
CHLORIDE SERPL-SCNC: 107 MMOL/L (ref 98–107)
CHOLEST SERPL-MCNC: 479 MG/DL (ref 0–199)
CHOLESTEROL/HDL RATIO: 16.6
CO2 SERPL-SCNC: 25 MMOL/L (ref 21–32)
CREAT SERPL-MCNC: 0.58 MG/DL (ref 0.5–1.05)
EGFRCR SERPLBLD CKD-EPI 2021: >90 ML/MIN/1.73M*2
ERYTHROCYTE [DISTWIDTH] IN BLOOD BY AUTOMATED COUNT: 13.5 % (ref 11.5–14.5)
GLUCOSE SERPL-MCNC: 98 MG/DL (ref 74–99)
HCT VFR BLD AUTO: 33.8 % (ref 36–46)
HDLC SERPL-MCNC: 28.8 MG/DL
HGB BLD-MCNC: 11 G/DL (ref 12–16)
MCH RBC QN AUTO: 30.9 PG (ref 26–34)
MCHC RBC AUTO-ENTMCNC: 32.5 G/DL (ref 32–36)
MCV RBC AUTO: 95 FL (ref 80–100)
NON-HDL CHOLESTEROL: 450 MG/DL (ref 0–149)
NRBC BLD-RTO: 0 /100 WBCS (ref 0–0)
P AXIS: 19 DEGREES
P AXIS: 24 DEGREES
P OFFSET: 181 MS
P OFFSET: 186 MS
P ONSET: 135 MS
P ONSET: 135 MS
PLATELET # BLD AUTO: 284 X10*3/UL (ref 150–450)
POTASSIUM SERPL-SCNC: 3.9 MMOL/L (ref 3.5–5.3)
PR INTERVAL: 166 MS
PR INTERVAL: 178 MS
Q ONSET: 218 MS
Q ONSET: 224 MS
QRS COUNT: 12 BEATS
QRS COUNT: 9 BEATS
QRS DURATION: 70 MS
QRS DURATION: 84 MS
QT INTERVAL: 426 MS
QT INTERVAL: 462 MS
QTC CALCULATION(BAZETT): 445 MS
QTC CALCULATION(BAZETT): 466 MS
QTC FREDERICIA: 451 MS
QTC FREDERICIA: 452 MS
R AXIS: 29 DEGREES
R AXIS: 3 DEGREES
RBC # BLD AUTO: 3.56 X10*6/UL (ref 4–5.2)
SODIUM SERPL-SCNC: 139 MMOL/L (ref 136–145)
T AXIS: -2 DEGREES
T AXIS: 10 DEGREES
T OFFSET: 437 MS
T OFFSET: 449 MS
VENTRICULAR RATE: 56 BPM
VENTRICULAR RATE: 72 BPM
WBC # BLD AUTO: 8.7 X10*3/UL (ref 4.4–11.3)

## 2025-05-22 PROCEDURE — G0269 OCCLUSIVE DEVICE IN VEIN ART: HCPCS | Mod: 59 | Performed by: INTERNAL MEDICINE

## 2025-05-22 PROCEDURE — 2550000001 HC RX 255 CONTRASTS: Mod: JW | Performed by: INTERNAL MEDICINE

## 2025-05-22 PROCEDURE — 99152 MOD SED SAME PHYS/QHP 5/>YRS: CPT | Performed by: INTERNAL MEDICINE

## 2025-05-22 PROCEDURE — 93454 CORONARY ARTERY ANGIO S&I: CPT | Performed by: INTERNAL MEDICINE

## 2025-05-22 PROCEDURE — 7100000002 HC RECOVERY ROOM TIME - EACH INCREMENTAL 1 MINUTE: Performed by: INTERNAL MEDICINE

## 2025-05-22 PROCEDURE — 027035Z DILATION OF CORONARY ARTERY, ONE ARTERY WITH TWO DRUG-ELUTING INTRALUMINAL DEVICES, PERCUTANEOUS APPROACH: ICD-10-PCS | Performed by: INTERNAL MEDICINE

## 2025-05-22 PROCEDURE — C1887 CATHETER, GUIDING: HCPCS | Performed by: INTERNAL MEDICINE

## 2025-05-22 PROCEDURE — 85027 COMPLETE CBC AUTOMATED: CPT | Performed by: STUDENT IN AN ORGANIZED HEALTH CARE EDUCATION/TRAINING PROGRAM

## 2025-05-22 PROCEDURE — C1894 INTRO/SHEATH, NON-LASER: HCPCS | Performed by: INTERNAL MEDICINE

## 2025-05-22 PROCEDURE — 2500000004 HC RX 250 GENERAL PHARMACY W/ HCPCS (ALT 636 FOR OP/ED): Mod: JZ | Performed by: STUDENT IN AN ORGANIZED HEALTH CARE EDUCATION/TRAINING PROGRAM

## 2025-05-22 PROCEDURE — 2500000001 HC RX 250 WO HCPCS SELF ADMINISTERED DRUGS (ALT 637 FOR MEDICARE OP): Performed by: NURSE PRACTITIONER

## 2025-05-22 PROCEDURE — 93458 L HRT ARTERY/VENTRICLE ANGIO: CPT | Mod: 59 | Performed by: INTERNAL MEDICINE

## 2025-05-22 PROCEDURE — 85347 COAGULATION TIME ACTIVATED: CPT | Performed by: INTERNAL MEDICINE

## 2025-05-22 PROCEDURE — 7100000009 HC PHASE TWO TIME - INITIAL BASE CHARGE: Performed by: INTERNAL MEDICINE

## 2025-05-22 PROCEDURE — 7100000001 HC RECOVERY ROOM TIME - INITIAL BASE CHARGE: Performed by: INTERNAL MEDICINE

## 2025-05-22 PROCEDURE — C1874 STENT, COATED/COV W/DEL SYS: HCPCS | Performed by: INTERNAL MEDICINE

## 2025-05-22 PROCEDURE — C1725 CATH, TRANSLUMIN NON-LASER: HCPCS | Performed by: INTERNAL MEDICINE

## 2025-05-22 PROCEDURE — 36415 COLL VENOUS BLD VENIPUNCTURE: CPT | Performed by: STUDENT IN AN ORGANIZED HEALTH CARE EDUCATION/TRAINING PROGRAM

## 2025-05-22 PROCEDURE — 2500000001 HC RX 250 WO HCPCS SELF ADMINISTERED DRUGS (ALT 637 FOR MEDICARE OP): Performed by: STUDENT IN AN ORGANIZED HEALTH CARE EDUCATION/TRAINING PROGRAM

## 2025-05-22 PROCEDURE — B2151ZZ FLUOROSCOPY OF LEFT HEART USING LOW OSMOLAR CONTRAST: ICD-10-PCS | Performed by: INTERNAL MEDICINE

## 2025-05-22 PROCEDURE — 92928 PRQ TCAT PLMT NTRAC ST 1 LES: CPT | Performed by: INTERNAL MEDICINE

## 2025-05-22 PROCEDURE — B2111ZZ FLUOROSCOPY OF MULTIPLE CORONARY ARTERIES USING LOW OSMOLAR CONTRAST: ICD-10-PCS | Performed by: INTERNAL MEDICINE

## 2025-05-22 PROCEDURE — 99024 POSTOP FOLLOW-UP VISIT: CPT | Performed by: NURSE PRACTITIONER

## 2025-05-22 PROCEDURE — 7100000010 HC PHASE TWO TIME - EACH INCREMENTAL 1 MINUTE: Performed by: INTERNAL MEDICINE

## 2025-05-22 PROCEDURE — 2720000007 HC OR 272 NO HCPCS: Performed by: INTERNAL MEDICINE

## 2025-05-22 PROCEDURE — 99232 SBSQ HOSP IP/OBS MODERATE 35: CPT

## 2025-05-22 PROCEDURE — 2500000001 HC RX 250 WO HCPCS SELF ADMINISTERED DRUGS (ALT 637 FOR MEDICARE OP): Performed by: INTERNAL MEDICINE

## 2025-05-22 PROCEDURE — C9600 PERC DRUG-EL COR STENT SING: HCPCS | Mod: LD | Performed by: INTERNAL MEDICINE

## 2025-05-22 PROCEDURE — 85347 COAGULATION TIME ACTIVATED: CPT

## 2025-05-22 PROCEDURE — C1760 CLOSURE DEV, VASC: HCPCS | Performed by: INTERNAL MEDICINE

## 2025-05-22 PROCEDURE — 2500000004 HC RX 250 GENERAL PHARMACY W/ HCPCS (ALT 636 FOR OP/ED): Mod: JZ | Performed by: NURSE PRACTITIONER

## 2025-05-22 PROCEDURE — C1769 GUIDE WIRE: HCPCS | Performed by: INTERNAL MEDICINE

## 2025-05-22 PROCEDURE — 93005 ELECTROCARDIOGRAM TRACING: CPT

## 2025-05-22 PROCEDURE — 99153 MOD SED SAME PHYS/QHP EA: CPT | Performed by: INTERNAL MEDICINE

## 2025-05-22 PROCEDURE — 2780000003 HC OR 278 NO HCPCS: Performed by: INTERNAL MEDICINE

## 2025-05-22 PROCEDURE — 82374 ASSAY BLOOD CARBON DIOXIDE: CPT | Performed by: STUDENT IN AN ORGANIZED HEALTH CARE EDUCATION/TRAINING PROGRAM

## 2025-05-22 PROCEDURE — 2060000001 HC INTERMEDIATE ICU ROOM DAILY

## 2025-05-22 PROCEDURE — 83718 ASSAY OF LIPOPROTEIN: CPT | Performed by: INTERNAL MEDICINE

## 2025-05-22 PROCEDURE — 96372 THER/PROPH/DIAG INJ SC/IM: CPT | Performed by: STUDENT IN AN ORGANIZED HEALTH CARE EDUCATION/TRAINING PROGRAM

## 2025-05-22 PROCEDURE — 2500000004 HC RX 250 GENERAL PHARMACY W/ HCPCS (ALT 636 FOR OP/ED): Performed by: INTERNAL MEDICINE

## 2025-05-22 DEVICE — STENT ONYXNG22518UX ONYX 2.25X18RX
Type: IMPLANTABLE DEVICE | Site: CORONARY | Status: FUNCTIONAL
Brand: ONYX FRONTIER™

## 2025-05-22 DEVICE — STENT ONYXNG20026UX ONYX 2.00X26RX
Type: IMPLANTABLE DEVICE | Site: CORONARY | Status: FUNCTIONAL
Brand: ONYX FRONTIER™

## 2025-05-22 RX ORDER — ENOXAPARIN SODIUM 100 MG/ML
40 INJECTION SUBCUTANEOUS EVERY 24 HOURS
Status: DISCONTINUED | OUTPATIENT
Start: 2025-05-22 | End: 2025-05-23 | Stop reason: HOSPADM

## 2025-05-22 RX ORDER — ACETAMINOPHEN 325 MG/1
650 TABLET ORAL EVERY 4 HOURS PRN
Status: DISCONTINUED | OUTPATIENT
Start: 2025-05-22 | End: 2025-05-23 | Stop reason: HOSPADM

## 2025-05-22 RX ORDER — MIDAZOLAM HYDROCHLORIDE 1 MG/ML
INJECTION, SOLUTION INTRAMUSCULAR; INTRAVENOUS AS NEEDED
Status: DISCONTINUED | OUTPATIENT
Start: 2025-05-22 | End: 2025-05-22 | Stop reason: HOSPADM

## 2025-05-22 RX ORDER — ACETAMINOPHEN 160 MG/5ML
650 SOLUTION ORAL EVERY 4 HOURS PRN
Status: DISCONTINUED | OUTPATIENT
Start: 2025-05-22 | End: 2025-05-23 | Stop reason: HOSPADM

## 2025-05-22 RX ORDER — SODIUM CHLORIDE 9 MG/ML
100 INJECTION, SOLUTION INTRAVENOUS CONTINUOUS
Status: DISCONTINUED | OUTPATIENT
Start: 2025-05-22 | End: 2025-05-22

## 2025-05-22 RX ORDER — FENTANYL CITRATE 50 UG/ML
INJECTION, SOLUTION INTRAMUSCULAR; INTRAVENOUS AS NEEDED
Status: DISCONTINUED | OUTPATIENT
Start: 2025-05-22 | End: 2025-05-22 | Stop reason: HOSPADM

## 2025-05-22 RX ORDER — TALC
3 POWDER (GRAM) TOPICAL NIGHTLY PRN
Status: DISCONTINUED | OUTPATIENT
Start: 2025-05-22 | End: 2025-05-23 | Stop reason: HOSPADM

## 2025-05-22 RX ORDER — ATORVASTATIN CALCIUM 80 MG/1
80 TABLET, FILM COATED ORAL NIGHTLY
Status: DISCONTINUED | OUTPATIENT
Start: 2025-05-22 | End: 2025-05-23 | Stop reason: HOSPADM

## 2025-05-22 RX ORDER — CLOPIDOGREL BISULFATE 75 MG/1
75 TABLET ORAL DAILY
Qty: 30 TABLET | Refills: 11 | Status: SHIPPED | OUTPATIENT
Start: 2025-05-22 | End: 2026-05-22

## 2025-05-22 RX ORDER — ONDANSETRON 4 MG/1
4 TABLET, FILM COATED ORAL EVERY 8 HOURS PRN
Status: DISCONTINUED | OUTPATIENT
Start: 2025-05-22 | End: 2025-05-23 | Stop reason: HOSPADM

## 2025-05-22 RX ORDER — NITROGLYCERIN 0.4 MG/1
0.4 TABLET SUBLINGUAL EVERY 5 MIN PRN
Qty: 25 TABLET | Refills: 3 | Status: SHIPPED | OUTPATIENT
Start: 2025-05-22

## 2025-05-22 RX ORDER — CLOPIDOGREL BISULFATE 75 MG/1
75 TABLET ORAL DAILY
Status: DISCONTINUED | OUTPATIENT
Start: 2025-05-22 | End: 2025-05-23 | Stop reason: HOSPADM

## 2025-05-22 RX ORDER — ONDANSETRON HYDROCHLORIDE 2 MG/ML
4 INJECTION, SOLUTION INTRAVENOUS EVERY 8 HOURS PRN
Status: DISCONTINUED | OUTPATIENT
Start: 2025-05-22 | End: 2025-05-23 | Stop reason: HOSPADM

## 2025-05-22 RX ORDER — ASPIRIN 81 MG/1
81 TABLET ORAL DAILY
Status: DISCONTINUED | OUTPATIENT
Start: 2025-05-22 | End: 2025-05-23 | Stop reason: HOSPADM

## 2025-05-22 RX ORDER — LIDOCAINE HYDROCHLORIDE 20 MG/ML
INJECTION, SOLUTION INFILTRATION; PERINEURAL AS NEEDED
Status: DISCONTINUED | OUTPATIENT
Start: 2025-05-22 | End: 2025-05-22 | Stop reason: HOSPADM

## 2025-05-22 RX ORDER — ACETAMINOPHEN 650 MG/1
650 SUPPOSITORY RECTAL EVERY 4 HOURS PRN
Status: DISCONTINUED | OUTPATIENT
Start: 2025-05-22 | End: 2025-05-23 | Stop reason: HOSPADM

## 2025-05-22 RX ORDER — POLYETHYLENE GLYCOL 3350 17 G/17G
17 POWDER, FOR SOLUTION ORAL DAILY
Status: DISCONTINUED | OUTPATIENT
Start: 2025-05-22 | End: 2025-05-23 | Stop reason: HOSPADM

## 2025-05-22 RX ORDER — ATORVASTATIN CALCIUM 80 MG/1
80 TABLET, FILM COATED ORAL NIGHTLY
Qty: 30 TABLET | Refills: 5 | Status: SHIPPED | OUTPATIENT
Start: 2025-05-22

## 2025-05-22 RX ORDER — MORPHINE SULFATE 4 MG/ML
4 INJECTION, SOLUTION INTRAMUSCULAR; INTRAVENOUS EVERY 4 HOURS PRN
Status: DISCONTINUED | OUTPATIENT
Start: 2025-05-22 | End: 2025-05-23 | Stop reason: HOSPADM

## 2025-05-22 RX ORDER — NITROGLYCERIN 0.4 MG/1
0.4 TABLET SUBLINGUAL EVERY 5 MIN PRN
Status: DISCONTINUED | OUTPATIENT
Start: 2025-05-22 | End: 2025-05-23 | Stop reason: HOSPADM

## 2025-05-22 RX ORDER — CLOPIDOGREL BISULFATE 300 MG/1
TABLET, FILM COATED ORAL AS NEEDED
Status: DISCONTINUED | OUTPATIENT
Start: 2025-05-22 | End: 2025-05-22 | Stop reason: HOSPADM

## 2025-05-22 RX ORDER — HEPARIN SODIUM 1000 [USP'U]/ML
INJECTION, SOLUTION INTRAVENOUS; SUBCUTANEOUS AS NEEDED
Status: DISCONTINUED | OUTPATIENT
Start: 2025-05-22 | End: 2025-05-22 | Stop reason: HOSPADM

## 2025-05-22 RX ADMIN — CLOPIDOGREL BISULFATE 600 MG: 300 TABLET, FILM COATED ORAL at 17:25

## 2025-05-22 RX ADMIN — MIDAZOLAM 2 MG: 1 INJECTION INTRAMUSCULAR; INTRAVENOUS at 16:53

## 2025-05-22 RX ADMIN — FENTANYL CITRATE 50 MCG: 50 INJECTION, SOLUTION INTRAMUSCULAR; INTRAVENOUS at 16:53

## 2025-05-22 RX ADMIN — CLOPIDOGREL BISULFATE 75 MG: 75 TABLET, FILM COATED ORAL at 09:37

## 2025-05-22 RX ADMIN — ASPIRIN 81 MG: 81 TABLET, COATED ORAL at 09:37

## 2025-05-22 RX ADMIN — MORPHINE SULFATE 4 MG: 4 INJECTION, SOLUTION INTRAMUSCULAR; INTRAVENOUS at 07:11

## 2025-05-22 RX ADMIN — MORPHINE SULFATE 4 MG: 4 INJECTION, SOLUTION INTRAMUSCULAR; INTRAVENOUS at 01:36

## 2025-05-22 RX ADMIN — ENOXAPARIN SODIUM 40 MG: 40 INJECTION SUBCUTANEOUS at 09:37

## 2025-05-22 RX ADMIN — HEPARIN SODIUM 6000 UNITS: 1000 INJECTION INTRAVENOUS; SUBCUTANEOUS at 17:07

## 2025-05-22 RX ADMIN — MORPHINE SULFATE 4 MG: 4 INJECTION, SOLUTION INTRAMUSCULAR; INTRAVENOUS at 20:07

## 2025-05-22 RX ADMIN — MORPHINE SULFATE 4 MG: 4 INJECTION, SOLUTION INTRAMUSCULAR; INTRAVENOUS at 12:54

## 2025-05-22 RX ADMIN — ATORVASTATIN CALCIUM 80 MG: 80 TABLET, FILM COATED ORAL at 01:35

## 2025-05-22 RX ADMIN — LIDOCAINE HYDROCHLORIDE 20 ML: 20 INJECTION, SOLUTION INFILTRATION; PERINEURAL at 16:58

## 2025-05-22 RX ADMIN — IOHEXOL 78 ML: 350 INJECTION, SOLUTION INTRAVENOUS at 17:22

## 2025-05-22 RX ADMIN — ATORVASTATIN CALCIUM 80 MG: 80 TABLET, FILM COATED ORAL at 20:07

## 2025-05-22 SDOH — ECONOMIC STABILITY: FOOD INSECURITY: WITHIN THE PAST 12 MONTHS, YOU WORRIED THAT YOUR FOOD WOULD RUN OUT BEFORE YOU GOT THE MONEY TO BUY MORE.: NEVER TRUE

## 2025-05-22 SDOH — SOCIAL STABILITY: SOCIAL INSECURITY
WITHIN THE LAST YEAR, HAVE YOU BEEN RAPED OR FORCED TO HAVE ANY KIND OF SEXUAL ACTIVITY BY YOUR PARTNER OR EX-PARTNER?: NO

## 2025-05-22 SDOH — SOCIAL STABILITY: SOCIAL INSECURITY: WITHIN THE LAST YEAR, HAVE YOU BEEN HUMILIATED OR EMOTIONALLY ABUSED IN OTHER WAYS BY YOUR PARTNER OR EX-PARTNER?: NO

## 2025-05-22 SDOH — SOCIAL STABILITY: SOCIAL INSECURITY: DO YOU FEEL ANYONE HAS EXPLOITED OR TAKEN ADVANTAGE OF YOU FINANCIALLY OR OF YOUR PERSONAL PROPERTY?: NO

## 2025-05-22 SDOH — ECONOMIC STABILITY: INCOME INSECURITY: IN THE PAST 12 MONTHS HAS THE ELECTRIC, GAS, OIL, OR WATER COMPANY THREATENED TO SHUT OFF SERVICES IN YOUR HOME?: NO

## 2025-05-22 SDOH — ECONOMIC STABILITY: FOOD INSECURITY: WITHIN THE PAST 12 MONTHS, THE FOOD YOU BOUGHT JUST DIDN'T LAST AND YOU DIDN'T HAVE MONEY TO GET MORE.: NEVER TRUE

## 2025-05-22 SDOH — SOCIAL STABILITY: SOCIAL INSECURITY
WITHIN THE LAST YEAR, HAVE YOU BEEN KICKED, HIT, SLAPPED, OR OTHERWISE PHYSICALLY HURT BY YOUR PARTNER OR EX-PARTNER?: NO

## 2025-05-22 SDOH — SOCIAL STABILITY: SOCIAL INSECURITY: WITHIN THE LAST YEAR, HAVE YOU BEEN AFRAID OF YOUR PARTNER OR EX-PARTNER?: NO

## 2025-05-22 SDOH — SOCIAL STABILITY: SOCIAL INSECURITY: ARE THERE ANY APPARENT SIGNS OF INJURIES/BEHAVIORS THAT COULD BE RELATED TO ABUSE/NEGLECT?: NO

## 2025-05-22 SDOH — SOCIAL STABILITY: SOCIAL INSECURITY: DO YOU FEEL UNSAFE GOING BACK TO THE PLACE WHERE YOU ARE LIVING?: NO

## 2025-05-22 SDOH — SOCIAL STABILITY: SOCIAL INSECURITY: HAVE YOU HAD ANY THOUGHTS OF HARMING ANYONE ELSE?: NO

## 2025-05-22 SDOH — SOCIAL STABILITY: SOCIAL INSECURITY: HAS ANYONE EVER THREATENED TO HURT YOUR FAMILY OR YOUR PETS?: NO

## 2025-05-22 SDOH — SOCIAL STABILITY: SOCIAL INSECURITY: DOES ANYONE TRY TO KEEP YOU FROM HAVING/CONTACTING OTHER FRIENDS OR DOING THINGS OUTSIDE YOUR HOME?: NO

## 2025-05-22 SDOH — SOCIAL STABILITY: SOCIAL INSECURITY: ARE YOU OR HAVE YOU BEEN THREATENED OR ABUSED PHYSICALLY, EMOTIONALLY, OR SEXUALLY BY ANYONE?: NO

## 2025-05-22 SDOH — SOCIAL STABILITY: SOCIAL INSECURITY: HAVE YOU HAD THOUGHTS OF HARMING ANYONE ELSE?: NO

## 2025-05-22 ASSESSMENT — PAIN - FUNCTIONAL ASSESSMENT
PAIN_FUNCTIONAL_ASSESSMENT: 0-10

## 2025-05-22 ASSESSMENT — PAIN SCALES - GENERAL
PAINLEVEL_OUTOF10: 0 - NO PAIN
PAINLEVEL_OUTOF10: 8
PAINLEVEL_OUTOF10: 8
PAINLEVEL_OUTOF10: 0 - NO PAIN
PAINLEVEL_OUTOF10: 7
PAINLEVEL_OUTOF10: 0 - NO PAIN
PAINLEVEL_OUTOF10: 0 - NO PAIN
PAINLEVEL_OUTOF10: 8
PAINLEVEL_OUTOF10: 0 - NO PAIN
PAINLEVEL_OUTOF10: 4
PAINLEVEL_OUTOF10: 0 - NO PAIN
PAINLEVEL_OUTOF10: 8
PAINLEVEL_OUTOF10: 8
PAINLEVEL_OUTOF10: 2
PAINLEVEL_OUTOF10: 3

## 2025-05-22 ASSESSMENT — LIFESTYLE VARIABLES
AUDIT-C TOTAL SCORE: 0
SKIP TO QUESTIONS 9-10: 1
HOW OFTEN DO YOU HAVE A DRINK CONTAINING ALCOHOL: NEVER
HOW MANY STANDARD DRINKS CONTAINING ALCOHOL DO YOU HAVE ON A TYPICAL DAY: PATIENT DOES NOT DRINK
AUDIT-C TOTAL SCORE: 0
HOW OFTEN DO YOU HAVE 6 OR MORE DRINKS ON ONE OCCASION: NEVER

## 2025-05-22 ASSESSMENT — COGNITIVE AND FUNCTIONAL STATUS - GENERAL
DAILY ACTIVITIY SCORE: 24
DAILY ACTIVITIY SCORE: 24
MOBILITY SCORE: 24
PATIENT BASELINE BEDBOUND: NO
MOBILITY SCORE: 24

## 2025-05-22 ASSESSMENT — PAIN DESCRIPTION - ORIENTATION
ORIENTATION: LEFT;MID
ORIENTATION: MID

## 2025-05-22 ASSESSMENT — PAIN DESCRIPTION - LOCATION
LOCATION: CHEST

## 2025-05-22 ASSESSMENT — ACTIVITIES OF DAILY LIVING (ADL)
TOILETING: INDEPENDENT
BATHING: INDEPENDENT
HEARING - LEFT EAR: FUNCTIONAL
WALKS IN HOME: INDEPENDENT
ADEQUATE_TO_COMPLETE_ADL: YES
DRESSING YOURSELF: INDEPENDENT
GROOMING: INDEPENDENT
HEARING - RIGHT EAR: FUNCTIONAL
LACK_OF_TRANSPORTATION: NO
FEEDING YOURSELF: INDEPENDENT
JUDGMENT_ADEQUATE_SAFELY_COMPLETE_DAILY_ACTIVITIES: YES
PATIENT'S MEMORY ADEQUATE TO SAFELY COMPLETE DAILY ACTIVITIES?: YES
ASSISTIVE_DEVICE: EYEGLASSES

## 2025-05-22 ASSESSMENT — PAIN DESCRIPTION - DESCRIPTORS
DESCRIPTORS: SHARP

## 2025-05-22 ASSESSMENT — PATIENT HEALTH QUESTIONNAIRE - PHQ9
SUM OF ALL RESPONSES TO PHQ9 QUESTIONS 1 & 2: 0
1. LITTLE INTEREST OR PLEASURE IN DOING THINGS: NOT AT ALL
2. FEELING DOWN, DEPRESSED OR HOPELESS: NOT AT ALL

## 2025-05-22 ASSESSMENT — PAIN DESCRIPTION - FREQUENCY: FREQUENCY: CONSTANT/CONTINUOUS

## 2025-05-22 ASSESSMENT — PAIN DESCRIPTION - PAIN TYPE: TYPE: ACUTE PAIN

## 2025-05-22 NOTE — SIGNIFICANT EVENT
Pt arrived on unit pre [facility]. VS's obtained, pulses palpated, Pt clipped/shaved for procedure, and IV flushed. While talking with Pt she states that Dr. Crook was supposed to notify Dr. Gooden to not perform procedure through right radial site d/t weak pulses. She also notified that she has spinal stenosis and has major difficulty lying flat for extended periods of time. Passing this information on to cath lab charge RN. Pt denies further needs at this time, awaiting cath lab.

## 2025-05-22 NOTE — H&P
Medical Group History and Physical      ASSESSMENT & PLAN:     54 y.o. female with a history of CAD s/p PTCA, essential hypertension presenting with chest pain.    #.  Chest pain  #.  CAD s/p PTCA with history of in-stent restenosis  - P/w substernal CP w/ radiation to left shoulder, with known history of CAD and most recently s/p JESSICA to proximal LAD for in-stent restenosis in August 2024 (see below)  - Troponin negative x 2, EKG negative for acute ischemic changes, CTPA reviewed and negative for PE  - Cardiology consult  - Telemetry  - Continuing home aspirin, Plavix, atorvastatin  - Pain control  - EKG as needed for recurrent chest pain    8/12/24 [facility] CONCLUSIONS:   1. The entire Left Main: <10% stenosis.   2. Proximal LAD Lesion: The percent stenosis is 95%.   3. Proximal LAD Lesion: Instent Restenosis.   4. Proximal LAD Lesion: pre-dilation, Resolute [place]on 3.00x22 post-dilation : 0% residual stenosis.   5. Mid LAD Lesion: The percent stenosis is 75%.   6. Distal LAD Lesion: The percent stenosis is 80%.   7. Mid 1st Diag LAD Lesion: The percent stenosis is 90%.   8. Proximal CX Lesion: The percent stenosis is 10-30%.   9. Distal RCA Lesion: The percent stenosis is 10-30%.  10. The Left Ventricular Ejection Fraction is 65%.      VTE PPX: Yelena Leggett MD    --Of note, this documentation is completed using the Dragon Dictation system (voice recognition software). There may be spelling and/or grammatical errors that were not corrected prior to final submission.--    HISTORY OF PRESENT ILLNESS:   Chief Complaint: chest pain    Fallon Stevens is a 54 y.o. female with a history of CAD s/p PTCA, essential hypertension presenting with substernal chest pain.  Patient states that she had sudden onset chest pain with radiation to the left shoulder.  She also felt nauseous and sweaty.  She states it feels similar to last year when she had in-stent restenosis.  Denies any shortness of breath,  palpitations, leg swelling, fever, chills.  Currently she is chest pain-free as she just received pain medication.       ER Course: Vital signs stable.  Labs notable for hyperglycemia and elevated D-dimer.  Troponin negative x 2.  EKG negative for ischemic changes.  CTPA negative for PE.  Patient was given IV fluids and pain control and full dose aspirin in the ER.    ROS  10 point review of systems negative except per HPI     PAST HISTORIES:     Past Medical History  She has no past medical history on file.    Surgical History  She has a past surgical history that includes Cardiac catheterization (N/A, 8/12/2024) and Cardiac catheterization (N/A, 8/12/2024).     Social History  She reports that she quit smoking about 32 years ago. Her smoking use included cigarettes. She has never used smokeless tobacco. No history on file for alcohol use and drug use.    Family History  Family History[1]    Allergies:  Patient has no known allergies.      OBJECTIVE:      Last Recorded Vitals  /63 (BP Location: Right arm, Patient Position: Sitting)   Pulse 62   Temp 36.4 °C (97.5 °F) (Temporal)   Resp 18   Wt 77.1 kg (170 lb)   SpO2 97%     Last I/O:  No intake/output data recorded.    Physical Exam   Gen: NAD, appears stated age  HEENT: EOM, MMM  CV: RRR, no murmurs rubs or gallops  Resp: Clear to auscultation bilaterally, normal effort  Abdomen: soft, NT,+BS  LE: No edema, no deformity  Neuro: A&Ox4, moving all extremities    LABS AND IMAGING:       Relevant Results  Labs Reviewed   CBC WITH AUTO DIFFERENTIAL - Abnormal       Result Value    WBC 10.8      nRBC 0.0      RBC 3.94 (*)     Hemoglobin 12.3      Hematocrit 37.8      MCV 96      MCH 31.2      MCHC 32.5      RDW 13.2      Platelets 342      Neutrophils % 67.6      Immature Granulocytes %, Automated 0.5      Lymphocytes % 24.7      Monocytes % 4.2      Eosinophils % 2.6      Basophils % 0.4      Neutrophils Absolute 7.34      Immature Granulocytes Absolute,  Automated 0.05      Lymphocytes Absolute 2.67      Monocytes Absolute 0.45      Eosinophils Absolute 0.28      Basophils Absolute 0.04     COMPREHENSIVE METABOLIC PANEL - Abnormal    Glucose 128 (*)     Sodium 137      Potassium 3.9      Chloride 106      Bicarbonate 23      Anion Gap 12      Urea Nitrogen 14      Creatinine 0.77      eGFR >90      Calcium 8.9      Albumin 3.8      Alkaline Phosphatase 74      Total Protein 6.6      AST 11      Bilirubin, Total 0.3      ALT 8     D-DIMER, VTE EXCLUSION - Abnormal    D-Dimer, Quantitative VTE Exclusion 627 (*)     Narrative:     The VTE Exclusion D-Dimer assay is reported in ng/mL Fibrinogen Equivalent Units (FEU).    Per 's instructions for use, a value of less than 500 ng/mL (FEU) may help to exclude DVT or PE in outpatients when the assay is used with a clinical pretest probability assessment.([facility] must utilize and document eCalc 'Wells Score Deep Vein Thrombosis Risk' for DVT exclusion only. Emergency Department should utilize  Guidelines for Emergency Department Use of the VTE Exclusion D-Dimer and Clinical Pretest probability assessment model for DVT or PE exclusion.)   MAGNESIUM - Normal    Magnesium 2.07     LACTATE - Normal    Lactate 1.4      Narrative:     Venipuncture immediately after or during the administration of Metamizole may lead to falsely low results. Testing should be performed immediately prior to Metamizole dosing.   PROTIME-INR - Normal    Protime 10.6      INR 1.0     B-TYPE NATRIURETIC PEPTIDE - Normal    BNP 57      Narrative:        <100 pg/mL - Heart failure unlikely  100-299 pg/mL - Intermediate probability of acute heart                  failure exacerbation. Correlate with clinical                  context and patient history.    >=300 pg/mL - Heart Failure likely. Correlate with clinical                  context and patient history.    BNP testing is performed using different testing methodology at [facility] than at other [facility]. Direct result comparisons should only be made within the same method.      SERIAL TROPONIN-INITIAL - Normal    Troponin I, High Sensitivity 3      Narrative:     Less than 99th percentile of normal range cutoff-  Female and children under 18 years old <14 ng/L; Male <21 ng/L: Negative  Repeat testing should be performed if clinically indicated.     Female and children under 18 years old 14-50 ng/L; Male 21-50 ng/L:  Consistent with possible cardiac damage and possible increased clinical   risk. Serial measurements may help to assess extent of myocardial damage.     >50 ng/L: Consistent with cardiac damage, increased clinical risk and  myocardial infarction. Serial measurements may help assess extent of   myocardial damage.      NOTE: Children less than 1 year old may have higher baseline troponin   levels and results should be interpreted in conjunction with the overall   clinical context.     NOTE: Troponin I testing is performed using a different   testing methodology at [facility] than at [facility]. Direct result comparisons should only   be made within the same method.   SERIAL TROPONIN, 1 HOUR - Normal    Troponin I, High Sensitivity 6      Narrative:     Less than 99th percentile of normal range cutoff-  Female and children under 18 years old <14 ng/L; Male <21 ng/L: Negative  Repeat testing should be performed if clinically indicated.     Female and children under 18 years old 14-50 ng/L; Male 21-50 ng/L:  Consistent with possible cardiac damage and possible increased clinical   risk. Serial measurements may help to assess extent of myocardial damage.     >50 ng/L: Consistent with cardiac damage, increased clinical risk and  myocardial infarction. Serial measurements may help assess extent of   myocardial damage.      NOTE: Children less than 1 year old may have higher baseline troponin   levels and results should be interpreted in  conjunction with the overall   clinical context.     NOTE: Troponin I testing is performed using a different   testing methodology at [facility] than at other   [facility]. Direct result comparisons should only   be made within the same method.   TROPONIN SERIES- (INITIAL, 1 HR)    Narrative:     The following orders were created for panel order Troponin I Series, High Sensitivity (0, 1 HR).  Procedure                               Abnormality         Status                     ---------                               -----------         ------                     Troponin I, High Sensiti...[[id number]]  Normal              Final result               Troponin, High Sensitivi...[[id number]]  Normal              Final result                 Please view results for these tests on the individual orders.     CT angio chest for pulmonary embolism   Final Result   1. No evidence of acute pulmonary embolism.   2.        MACRO:   None.        Signed by: Timmy Galeano 5/21/2025 8:15 PM   Dictation workstation:   [id number]      XR chest 1 view   Final Result   1.  No evidence of acute cardiopulmonary process.                  MACRO:   None        Signed by: Leatha Sosa 5/21/2025 6:53 PM   Dictation workstation:   [id number]                 [1] No family history on file.

## 2025-05-22 NOTE — SIGNIFICANT EVENT
HOB increased to 30 degrees. Right groin site soft and stable with no hematoma or oozing. Pt drinking water but declines food at this time.

## 2025-05-22 NOTE — PROGRESS NOTES
Patient is stable status post LHC/PCI under the care of Dr. Gooden.  Discussed results of procedure with patient and her family members.  Pictures provided.  Findings of the LHC revealed 95% stenosis in the mid LAD, 90% stenosis in the distal LAD, mild disease of the circumflex and RCA.  Patient underwent successful PCI with 2 drug-eluting stents placed to the LAD reducing those lesions down to 0% stenosis.  The patient tolerated the procedure well.  Please see procedural report for complete details.  Patient will continue DAPT with aspirin 81 mg daily and Plavix 75 mg daily.  She will also continue high intensity statin therapy.  She was not initiated on beta-blocker therapy due to hypotension.  Continue to monitor on telemetry.  Patient will be transferred to the stepdown unit post recovery.  Possible discharge on 5/23/2025 barring no postprocedural complications.  Outpatient follow-up with cardiology has been arranged.  Postprocedural activity, restrictions, potential complications, medications and future follow-up discussed at length.  All questions answered.  All verbalized an understanding.

## 2025-05-22 NOTE — POST-PROCEDURE NOTE
Physician Transition of Care Summary  Invasive Cardiovascular Lab    Procedure Date: 5/22/2025  Attending:    Adilia Gooden - Primary  Resident/Fellow/Other Assistant: Surgeons and Role:  * No surgeons found with a matching role *    Indications:   Pre-op Diagnosis      * Chest pain, unspecified type [R07.9]     * Coronary artery disease with history of percutaneous transluminal angioplasty (PTCA) [I25.10, Z98.61]    Post-procedure diagnosis:   Post-op Diagnosis     * Chest pain, unspecified type [R07.9]     * Coronary artery disease with history of percutaneous transluminal angioplasty (PTCA) [I25.10, Z98.61]    Procedure(s):   LHC, With LV  68604 - AR CATH PLMT L HRT & ARTS W/NJX & ANGIO IMG S&I    PCI SANTOSH Stent- Coronary  80608 - AR PRQ TRLUML CORONRY CHRONIC OCCLUS REVASC ONE VSL        Procedure Findings:   95% stenosis of mid LAD, 90% stenosis of distal LAD, mild disease of circumflex and right coronary artery, successful PTCA drug-eluting stent of mid and distal LAD    Description of the Procedure:   Left heart cath coronary angiography left ventriculogram, PTCA drug-eluting stent of the mid and distal LAD    Complications:   None    Stents/Implants:   Implants          Stent          Stent, Dimmitt Maunabo Santosh, 2.25 X 18rx - [id number] - Implanted        Inventory item: STENT, KRIS FRONTIER SANTOSH, 2.25 X 18RX Model/Cat number: [id number]    : [employer] Lot number: [id number]    Device identifier: [id number]        GUDID Information       Request status Successful        Brand name: Dimmitt [place]™ Version/Model: [id number]    Company name: [employer] MRI safety info as of 5/22/25: MR Conditional    Contains dry or latex rubber: No      GMDN P.T. name: Drug-eluting coronary artery stent, non-bioabsorbable-polymer-coated                As of 5/22/2025       Status: Implanted                        Stent, Dimmitt Maunabo Santosh, 2.00 X 26rx - [id number] - Implanted        Inventory item:  STENT, KRIS FRONTIER JESSICA, 2.00 X 26RX Model/Cat number: [id number]    : [employer] Lot number: [id number]    Device identifier: [id number]        GUDID Information       Request status Successful        Brand name: [employer]™ Version/Model: [id number]    Company name: [employer] MRI safety info as of 5/22/25: MR Conditional    Contains dry or latex rubber: No      GMDN P.T. name: Drug-eluting coronary artery stent, non-bioabsorbable-polymer-coated                As of 5/22/2025       Status: Implanted                              Anticoagulation/Antiplatelet Plan:   Intravenous heparin, Plavix load    Estimated Blood Loss:   * No values recorded between 5/22/2025  4:42 PM and 5/22/2025  5:23 PM *    Anesthesia: Moderate Sedation Anesthesia Staff: No anesthesia staff entered.    Any Specimen(s) Removed:   No specimens collected during this procedure.    Disposition:   Dual antiplatelet therapy      Electronically signed by: Calderon Gooden MD, 5/22/2025 5:23 PM

## 2025-05-22 NOTE — CONSULTS
Cardiology Consult Note  Patient: Fallon Stevens  Unit/Bed: 914/914-A  YOB: 1970  MRN: [id number]  Acct: [id number]   Admitting Diagnosis: Chest pain [R07.9]  Chest pain, unspecified type [R07.9]  Date:  5/21/2025  Hospital Day: 0  Attending: Beatris Bhat MD    Rounding Cardiologist:  Drake Crook MD,    Consultant: Dr. Drake Crook     Primary Cardiologist: None, has not followed up      Complaint:  Chief Complaint   Patient presents with    Chest Pain     Mid sternal non radiating chest pain. Took 3 nitroglycerin at home. Hx of MI and 4 cardiac caths        History of Present Illness:  54-year-old woman with known coronary disease initial LAD stenting 2018 returned August last year with unstable angina found to have restenosis of proximal LAD and treated with stenting.  At that time discharged on dual antiplatelet medications and statin but did not keep any cardiology follow-up.  He had lost insurance coverage and could not make appointments.  She was able through help from friends to keep on her statin and antiplatelet agents.    Presents emergency department 5/21/2020 5 in the evening describing midsternal chest discomfort.  Pain occurred with rest.  Did say she was being compliant with her statin and antiplatelet agent.  In the emergency room showed no evidence of pulmonary embolism.  D-dimer elevated for age BNP was normal at 55 serial troponins were normal as well.    She says for the most part she has been doing fairly well.  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  Occasionally with aches and pains in the legs when she goes upstairs related to that process.  She had not had angina until yesterday.  She describes sudden onset of acute diaphoresis anter precordial tightness radiating down to both elbows associate with shortness of breath.  It also caused some anxiety.  She did not  "have nitroglycerin and so came to the emergency department.  Has been rendered asymptomatic currently.  She has had no palpitation, lightheadedness or syncope.  Has had no need of hospitalizations.    Personal review of ECG normal sinus rhythm minor nonspecific ST abnormality    Cardiac history  Coronary artery disease: 8/12/2024 cath/PTCA: LAD-95% in-stent stenosis subsequently with stent placed within prior stent.  Severe disease distal LAD and mid first diagonal also described.  LM-10%, RCA/CX-25%.  LV function normal  Initial angioplasty 2018 at [facility] in [place]  Hypertension  Hyperlipidemia      Allergies:  RX Allergies[1]     PMHx:  Medical History[2]  Hypertension  coronary disease  Lumbar spine stenosis  PSHx:  Surgical History[3]  Coronary angiography   Spine disease with previous laminectomy  Social Hx:  Social History[4]  No smoking for 30 years  Family Hx:  Family History[5]  Very strong family history of premature coronary disease in mother and all of her mother's relatives with infarct/mortality in their 50s or before  Review of Systems:   Review of Systems   All other systems reviewed and are negative.      Vitals:    05/22/25 0426 05/22/25 0437 05/22/25 0610 05/22/25 0705   BP: 104/70 115/63  96/55   BP Location:       Patient Position:  Sitting  Sitting   Pulse: 71 58  57   Resp: 18 19  19   Temp:  36.2 °C (97.2 °F)  36.5 °C (97.7 °F)   TempSrc:       SpO2: 98% 96%  94%   Weight:   83 kg (182 lb 15.7 oz)    Height:   1.6 m (5' 2.99\")      No intake or output data in the 24 hours ending 05/22/25 1003   Wt Readings from Last 4 Encounters:   05/22/25 83 kg (182 lb 15.7 oz)   08/10/24 77.1 kg (170 lb)      Physical Examination:       Physical Exam  Constitutional:       Appearance: Normal appearance. She is obese. She is not ill-appearing or diaphoretic.   HENT:      Head: Normocephalic.   Eyes:      Extraocular Movements: Extraocular movements intact.      Pupils: Pupils are equal, round, and " reactive to light.      Comments: Skin around the eyes with cholesterol deposition   Cardiovascular:      Rate and Rhythm: Normal rate and regular rhythm.      Heart sounds: No murmur heard.     No gallop.      Comments: Femoral pulses pedal pulses 2+ radial pulses very weak particularly on the right.  Abdominal:      General: Abdomen is flat. Bowel sounds are normal.      Palpations: Abdomen is soft.   Musculoskeletal:         General: Normal range of motion.      Cervical back: Normal range of motion.   Skin:     General: Skin is warm and dry.   Neurological:      General: No focal deficit present.      Mental Status: She is alert and oriented to person, place, and time.   Psychiatric:         Mood and Affect: Mood normal.         LABS:  CBC:   Results from last 7 days   Lab Units 05/22/25  0620 05/21/25  1842   WBC AUTO x10*3/uL 8.7 10.8   RBC AUTO x10*6/uL 3.56* 3.94*   HEMOGLOBIN g/dL 11.0* 12.3   HEMATOCRIT % 33.8* 37.8   MCV fL 95 96   MCH pg 30.9 31.2   MCHC g/dL 32.5 32.5   RDW % 13.5 13.2   PLATELETS AUTO x10*3/uL 284 342     CMP:    Results from last 7 days   Lab Units 05/22/25  0619 05/21/25  1842   SODIUM mmol/L 139 137   POTASSIUM mmol/L 3.9 3.9   CHLORIDE mmol/L 107 106   CO2 mmol/L 25 23   BUN mg/dL 11 14   CREATININE mg/dL 0.58 0.77   GLUCOSE mg/dL 98 128*   PROTEIN TOTAL g/dL  --  6.6   CALCIUM mg/dL 8.7 8.9   BILIRUBIN TOTAL mg/dL  --  0.3   ALK PHOS U/L  --  74   AST U/L  --  11   ALT U/L  --  8     BMP:    Results from last 7 days   Lab Units 05/22/25  0619 05/21/25  1842   SODIUM mmol/L 139 137   POTASSIUM mmol/L 3.9 3.9   CHLORIDE mmol/L 107 106   CO2 mmol/L 25 23   BUN mg/dL 11 14   CREATININE mg/dL 0.58 0.77   CALCIUM mg/dL 8.7 8.9   GLUCOSE mg/dL 98 128*     Magnesium:  Results from last 7 days   Lab Units 05/21/25  1842   MAGNESIUM mg/dL 2.07     Troponin:    Results from last 7 days   Lab Units 05/21/25 1952 05/21/25  1842   TROPHS ng/L 6 3     BNP:   Results from last 7 days   Lab Units  05/21/25  1842   BNP pg/mL 57     Lipid Panel:         Current Medications:  Scheduled Medications[6]   Continuous Medications[7]   Current Outpatient Medications   Medication Instructions    aspirin 81 mg, Daily    atorvastatin (LIPITOR) 80 mg, Daily    clopidogrel (PLAVIX) 75 mg, oral, Daily    isosorbide mononitrate ER (IMDUR) 30 mg, oral, Daily, Do not crush or chew.    nitroglycerin (NITROSTAT) 0.4 mg, Every 5 min PRN        ECG 12 lead  Result Date: 5/22/2025  Sinus bradycardia Otherwise normal ECG When compared with ECG of 21-MAY-2025 19:54, (unconfirmed) No significant change was found    ECG 12 lead  Result Date: 5/22/2025  Normal sinus rhythm Normal ECG When compared with ECG of 21-MAY-2025 18:08, (unconfirmed) No significant change was found    CT angio chest for pulmonary embolism  Result Date: 5/21/2025  Interpreted By:  Timmy Galeano, STUDY: CT ANGIO CHEST FOR PULMONARY EMBOLISM;  5/21/2025 7:47 pm   INDICATION: Signs/Symptoms:chest pain.   COMPARISON: None   ACCESSION NUMBER(S): [id number]   ORDERING CLINICIAN: GHULAM RIVERA   TECHNIQUE: Helical data acquisition of the chest was obtained after intravenous administration of 75 ML Omnipaque 350 as per PE protocol. Images were reformatted in coronal and sagittal planes. Axial and coronal maximum intensity projection (MIP) images were created and reviewed.   FINDINGS: Bronchial thickening. No localizing infiltrate effusion or pneumothorax. Coronary stents detected. No acute pulmonary embolism. Ascending aorta measures 3.7 cm   Demineralized bones   No adenopathy.   Imaging of the upper abdomen shows mild hepatic steatosis.   No suspicious osseous lesion. Small sliding hiatal hernia.         1. No evidence of acute pulmonary embolism. 2.   MACRO: None.   Signed by: Timmy Galeano 5/21/2025 8:15 PM Dictation workstation:   [id number]    XR chest 1 view  Result Date: 5/21/2025  Interpreted By:  Leatha Sosa, STUDY: XR CHEST 1 VIEW;  5/21/2025 6:48 pm    INDICATION: Signs/Symptoms:Chest Pain.     COMPARISON: Chest x-ray 10/2024.   ACCESSION NUMBER(S): [id number]   ORDERING CLINICIAN: GHULAM RIVERA   FINDINGS: AP radiograph of the chest was provided.       CARDIOMEDIASTINAL SILHOUETTE: Cardiomediastinal silhouette is normal in size and configuration.   LUNGS: Lungs are clear without focal consolidation or airspace opacity. No pleural effusion or pneumothorax.   ABDOMEN: No remarkable upper abdominal findings.   BONES: No acute bony abnormality. Lower cervical spine ACDF hardware partially visualized.       1.  No evidence of acute cardiopulmonary process.       MACRO: None   Signed by: Leatha Sosa 5/21/2025 6:53 PM Dictation workstation:   [id number]    ECG 12 lead  Result Date: 5/21/2025  Normal sinus rhythm Low voltage QRS Borderline ECG When compared with ECG of 13-AUG-2024 07:34, Questionable change in QRS axis       No results found for this or any previous visit from the past 1095 days.                 Encounter Date: 05/21/25   ECG 12 lead   Result Value    Ventricular Rate 56    Atrial Rate 56    ID Interval 166    QRS Duration 84    QT Interval 462    QTC Calculation(Bazett) 445    P Axis 19    R Axis 3    T Axis 10    QRS Count 9    Q Onset 218    P Onset 135    P Offset 181    T Offset 449    QTC Fredericia 451    Narrative    Sinus bradycardia  Otherwise normal ECG  When compared with ECG of 21-MAY-2025 19:54, (unconfirmed)  No significant change was found        Tele monitoring: Sinus bradycardia/sinus rhythm no ventricular ectopy        Assessment:    Plan:  Unstable angina: Patient symptoms classic for unstable angina.  She has a remarkably strong family history of premature coronary disease.  Currently with normal cardiac enzymes and no acute ST-T changes however this was the identical presentation she had a year ago with critical proximal LAD restenosis.  She has a very strong family history of premature coronary disease and aggressive vascular  pathology.  That being the case we will proceed with coronary angiography  Risk/benefits/potential outcomes: Reviewed the risk of myocardial infarction, stroke, arterial injury, contrast reaction, renal failure she understands these risks agrees to proceed.  High risk of severe disease hence recommendation of catheterization discussed that is very possible she has restenosis of the LAD versus progressive disease in the circumflex or distal LAD which might be managed medically as opposed to stenting.  Stenting or balloon angioplasty will be pursued depending upon pathology identified.  I reviewed it is possible that she has developed multivessel disease however coronary angiography less than a year ago did not show severe disease of RCA or circumflex somewhat unlikely that they would have progressed.  Access issues: Radial pulses are diminished.  She has had attempts at radial in the past that were unsuccessful team to consider pursuing procedure via right femoral artery  Hyperlipidemia: Has not had a recent lipid profile.  I suspect that she will need PCSK9 inhibitor in addition to the statin therapy certainly if she has evidence of correction would strongly consider addition thereof.  Hypertension: Controlled  Sinus bradycardia: Mild.  She has not had significant RCA disease in the past.  May have a component of conduction disease.  No indication for beta-blockers at this time.  Further recommendations after coronary angiography has been pursued              Electronically signed by Drake Crook MD on 5/22/2025 at 10:03 AM          [1] No Known Allergies  [2] History reviewed. No pertinent past medical history.  [3]   Past Surgical History:  Procedure Laterality Date    CARDIAC CATHETERIZATION N/A 8/12/2024    Procedure: Left Heart Cath;  Surgeon: Luis Barragan MD;  Location: [facility];  Service: Cardiovascular;  Laterality: N/A;    CARDIAC CATHETERIZATION N/A 8/12/2024    Procedure: PCI JESSICA  Stent- Coronary;  Surgeon: Luis Barragan MD;  Location: [facility];  Service: Cardiovascular;  Laterality: N/A;   [4]   Social History  Socioeconomic History    Marital status:    Tobacco Use    Smoking status: Former     Current packs/day: 0.00     Types: Cigarettes     Quit date:      Years since quittin.4    Smokeless tobacco: Never     Social Drivers of Health     Financial Resource Strain: Low Risk  (2025)    Overall Financial Resource Strain (CARDIA)     Difficulty of Paying Living Expenses: Not hard at all   Food Insecurity: No Food Insecurity (2025)    Hunger Vital Sign     Worried About Running Out of Food in the Last Year: Never true     Ran Out of Food in the Last Year: Never true   Transportation Needs: No Transportation Needs (2025)    PRAPARE - Transportation     Lack of Transportation (Medical): No     Lack of Transportation (Non-Medical): No   Intimate Partner Violence: Not At Risk (2025)    Humiliation, Afraid, Rape, and Kick questionnaire     Fear of Current or Ex-Partner: No     Emotionally Abused: No     Physically Abused: No     Sexually Abused: No   Housing Stability: Low Risk  (2025)    Housing Stability Vital Sign     Unable to Pay for Housing in the Last Year: No     Number of Times Moved in the Last Year: 0     Homeless in the Last Year: No   [5] No family history on file.  [6] aspirin, 81 mg, oral, Daily  atorvastatin, 80 mg, oral, Nightly  clopidogrel, 75 mg, oral, Daily  enoxaparin, 40 mg, subcutaneous, q24h  polyethylene glycol, 17 g, oral, Daily  [7]

## 2025-05-22 NOTE — CARE PLAN
The patient's goals for the shift include      The clinical goals for the shift include chest pain free throughout shift

## 2025-05-22 NOTE — H&P (VIEW-ONLY)
Cardiology Consult Note  Patient: Fallon Stevens  Unit/Bed: 914/914-A  YOB: 1970  MRN: [id number]  Acct: [id number]   Admitting Diagnosis: Chest pain [R07.9]  Chest pain, unspecified type [R07.9]  Date:  5/21/2025  Hospital Day: 0  Attending: Beatris Bhat MD    Rounding Cardiologist:  Drake Crook MD,    Consultant: Dr. Drake Crook     Primary Cardiologist: None, has not followed up      Complaint:  Chief Complaint   Patient presents with    Chest Pain     Mid sternal non radiating chest pain. Took 3 nitroglycerin at home. Hx of MI and 4 cardiac caths        History of Present Illness:  54-year-old woman with known coronary disease initial LAD stenting 2018 returned August last year with unstable angina found to have restenosis of proximal LAD and treated with stenting.  At that time discharged on dual antiplatelet medications and statin but did not keep any cardiology follow-up.  He had lost insurance coverage and could not make appointments.  She was able through help from friends to keep on her statin and antiplatelet agents.    Presents emergency department 5/21/2020 5 in the evening describing midsternal chest discomfort.  Pain occurred with rest.  Did say she was being compliant with her statin and antiplatelet agent.  In the emergency room showed no evidence of pulmonary embolism.  D-dimer elevated for age BNP was normal at 55 serial troponins were normal as well.    She says for the most part she has been doing fairly well.  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  Occasionally with aches and pains in the legs when she goes upstairs related to that process.  She had not had angina until yesterday.  She describes sudden onset of acute diaphoresis anter precordial tightness radiating down to both elbows associate with shortness of breath.  It also caused some anxiety.  She did not  "have nitroglycerin and so came to the emergency department.  Has been rendered asymptomatic currently.  She has had no palpitation, lightheadedness or syncope.  Has had no need of hospitalizations.    Personal review of ECG normal sinus rhythm minor nonspecific ST abnormality    Cardiac history  Coronary artery disease: 8/12/2024 cath/PTCA: LAD-95% in-stent stenosis subsequently with stent placed within prior stent.  Severe disease distal LAD and mid first diagonal also described.  LM-10%, RCA/CX-25%.  LV function normal  Initial angioplasty 2018 at [facility] in [place]  Hypertension  Hyperlipidemia      Allergies:  RX Allergies[1]     PMHx:  Medical History[2]  Hypertension  coronary disease  Lumbar spine stenosis  PSHx:  Surgical History[3]  Coronary angiography   Spine disease with previous laminectomy  Social Hx:  Social History[4]  No smoking for 30 years  Family Hx:  Family History[5]  Very strong family history of premature coronary disease in mother and all of her mother's relatives with infarct/mortality in their 50s or before  Review of Systems:   Review of Systems   All other systems reviewed and are negative.      Vitals:    05/22/25 0426 05/22/25 0437 05/22/25 0610 05/22/25 0705   BP: 104/70 115/63  96/55   BP Location:       Patient Position:  Sitting  Sitting   Pulse: 71 58  57   Resp: 18 19  19   Temp:  36.2 °C (97.2 °F)  36.5 °C (97.7 °F)   TempSrc:       SpO2: 98% 96%  94%   Weight:   83 kg (182 lb 15.7 oz)    Height:   1.6 m (5' 2.99\")      No intake or output data in the 24 hours ending 05/22/25 1003   Wt Readings from Last 4 Encounters:   05/22/25 83 kg (182 lb 15.7 oz)   08/10/24 77.1 kg (170 lb)      Physical Examination:       Physical Exam  Constitutional:       Appearance: Normal appearance. She is obese. She is not ill-appearing or diaphoretic.   HENT:      Head: Normocephalic.   Eyes:      Extraocular Movements: Extraocular movements intact.      Pupils: Pupils are equal, round, and " reactive to light.      Comments: Skin around the eyes with cholesterol deposition   Cardiovascular:      Rate and Rhythm: Normal rate and regular rhythm.      Heart sounds: No murmur heard.     No gallop.      Comments: Femoral pulses pedal pulses 2+ radial pulses very weak particularly on the right.  Abdominal:      General: Abdomen is flat. Bowel sounds are normal.      Palpations: Abdomen is soft.   Musculoskeletal:         General: Normal range of motion.      Cervical back: Normal range of motion.   Skin:     General: Skin is warm and dry.   Neurological:      General: No focal deficit present.      Mental Status: She is alert and oriented to person, place, and time.   Psychiatric:         Mood and Affect: Mood normal.         LABS:  CBC:   Results from last 7 days   Lab Units 05/22/25  0620 05/21/25  1842   WBC AUTO x10*3/uL 8.7 10.8   RBC AUTO x10*6/uL 3.56* 3.94*   HEMOGLOBIN g/dL 11.0* 12.3   HEMATOCRIT % 33.8* 37.8   MCV fL 95 96   MCH pg 30.9 31.2   MCHC g/dL 32.5 32.5   RDW % 13.5 13.2   PLATELETS AUTO x10*3/uL 284 342     CMP:    Results from last 7 days   Lab Units 05/22/25  0619 05/21/25  1842   SODIUM mmol/L 139 137   POTASSIUM mmol/L 3.9 3.9   CHLORIDE mmol/L 107 106   CO2 mmol/L 25 23   BUN mg/dL 11 14   CREATININE mg/dL 0.58 0.77   GLUCOSE mg/dL 98 128*   PROTEIN TOTAL g/dL  --  6.6   CALCIUM mg/dL 8.7 8.9   BILIRUBIN TOTAL mg/dL  --  0.3   ALK PHOS U/L  --  74   AST U/L  --  11   ALT U/L  --  8     BMP:    Results from last 7 days   Lab Units 05/22/25  0619 05/21/25  1842   SODIUM mmol/L 139 137   POTASSIUM mmol/L 3.9 3.9   CHLORIDE mmol/L 107 106   CO2 mmol/L 25 23   BUN mg/dL 11 14   CREATININE mg/dL 0.58 0.77   CALCIUM mg/dL 8.7 8.9   GLUCOSE mg/dL 98 128*     Magnesium:  Results from last 7 days   Lab Units 05/21/25  1842   MAGNESIUM mg/dL 2.07     Troponin:    Results from last 7 days   Lab Units 05/21/25 1952 05/21/25  1842   TROPHS ng/L 6 3     BNP:   Results from last 7 days   Lab Units  05/21/25  1842   BNP pg/mL 57     Lipid Panel:         Current Medications:  Scheduled Medications[6]   Continuous Medications[7]   Current Outpatient Medications   Medication Instructions    aspirin 81 mg, Daily    atorvastatin (LIPITOR) 80 mg, Daily    clopidogrel (PLAVIX) 75 mg, oral, Daily    isosorbide mononitrate ER (IMDUR) 30 mg, oral, Daily, Do not crush or chew.    nitroglycerin (NITROSTAT) 0.4 mg, Every 5 min PRN        ECG 12 lead  Result Date: 5/22/2025  Sinus bradycardia Otherwise normal ECG When compared with ECG of 21-MAY-2025 19:54, (unconfirmed) No significant change was found    ECG 12 lead  Result Date: 5/22/2025  Normal sinus rhythm Normal ECG When compared with ECG of 21-MAY-2025 18:08, (unconfirmed) No significant change was found    CT angio chest for pulmonary embolism  Result Date: 5/21/2025  Interpreted By:  Timmy Galeano, STUDY: CT ANGIO CHEST FOR PULMONARY EMBOLISM;  5/21/2025 7:47 pm   INDICATION: Signs/Symptoms:chest pain.   COMPARISON: None   ACCESSION NUMBER(S): [id number]   ORDERING CLINICIAN: GHULAM RIVERA   TECHNIQUE: Helical data acquisition of the chest was obtained after intravenous administration of 75 ML Omnipaque 350 as per PE protocol. Images were reformatted in coronal and sagittal planes. Axial and coronal maximum intensity projection (MIP) images were created and reviewed.   FINDINGS: Bronchial thickening. No localizing infiltrate effusion or pneumothorax. Coronary stents detected. No acute pulmonary embolism. Ascending aorta measures 3.7 cm   Demineralized bones   No adenopathy.   Imaging of the upper abdomen shows mild hepatic steatosis.   No suspicious osseous lesion. Small sliding hiatal hernia.         1. No evidence of acute pulmonary embolism. 2.   MACRO: None.   Signed by: Timmy Galeano 5/21/2025 8:15 PM Dictation workstation:   [id number]    XR chest 1 view  Result Date: 5/21/2025  Interpreted By:  Leatha Sosa, STUDY: XR CHEST 1 VIEW;  5/21/2025 6:48 pm    INDICATION: Signs/Symptoms:Chest Pain.     COMPARISON: Chest x-ray 10/2024.   ACCESSION NUMBER(S): [id number]   ORDERING CLINICIAN: GHULAM RIVERA   FINDINGS: AP radiograph of the chest was provided.       CARDIOMEDIASTINAL SILHOUETTE: Cardiomediastinal silhouette is normal in size and configuration.   LUNGS: Lungs are clear without focal consolidation or airspace opacity. No pleural effusion or pneumothorax.   ABDOMEN: No remarkable upper abdominal findings.   BONES: No acute bony abnormality. Lower cervical spine ACDF hardware partially visualized.       1.  No evidence of acute cardiopulmonary process.       MACRO: None   Signed by: Leatha Sosa 5/21/2025 6:53 PM Dictation workstation:   [id number]    ECG 12 lead  Result Date: 5/21/2025  Normal sinus rhythm Low voltage QRS Borderline ECG When compared with ECG of 13-AUG-2024 07:34, Questionable change in QRS axis       No results found for this or any previous visit from the past 1095 days.                 Encounter Date: 05/21/25   ECG 12 lead   Result Value    Ventricular Rate 56    Atrial Rate 56    NM Interval 166    QRS Duration 84    QT Interval 462    QTC Calculation(Bazett) 445    P Axis 19    R Axis 3    T Axis 10    QRS Count 9    Q Onset 218    P Onset 135    P Offset 181    T Offset 449    QTC Fredericia 451    Narrative    Sinus bradycardia  Otherwise normal ECG  When compared with ECG of 21-MAY-2025 19:54, (unconfirmed)  No significant change was found        Tele monitoring: Sinus bradycardia/sinus rhythm no ventricular ectopy        Assessment:    Plan:  Unstable angina: Patient symptoms classic for unstable angina.  She has a remarkably strong family history of premature coronary disease.  Currently with normal cardiac enzymes and no acute ST-T changes however this was the identical presentation she had a year ago with critical proximal LAD restenosis.  She has a very strong family history of premature coronary disease and aggressive vascular  pathology.  That being the case we will proceed with coronary angiography  Risk/benefits/potential outcomes: Reviewed the risk of myocardial infarction, stroke, arterial injury, contrast reaction, renal failure she understands these risks agrees to proceed.  High risk of severe disease hence recommendation of catheterization discussed that is very possible she has restenosis of the LAD versus progressive disease in the circumflex or distal LAD which might be managed medically as opposed to stenting.  Stenting or balloon angioplasty will be pursued depending upon pathology identified.  I reviewed it is possible that she has developed multivessel disease however coronary angiography less than a year ago did not show severe disease of RCA or circumflex somewhat unlikely that they would have progressed.  Access issues: Radial pulses are diminished.  She has had attempts at radial in the past that were unsuccessful team to consider pursuing procedure via right femoral artery  Hyperlipidemia: Has not had a recent lipid profile.  I suspect that she will need PCSK9 inhibitor in addition to the statin therapy certainly if she has evidence of correction would strongly consider addition thereof.  Hypertension: Controlled  Sinus bradycardia: Mild.  She has not had significant RCA disease in the past.  May have a component of conduction disease.  No indication for beta-blockers at this time.  Further recommendations after coronary angiography has been pursued              Electronically signed by Drake Crook MD on 5/22/2025 at 10:03 AM          [1] No Known Allergies  [2] History reviewed. No pertinent past medical history.  [3]   Past Surgical History:  Procedure Laterality Date    CARDIAC CATHETERIZATION N/A 8/12/2024    Procedure: Left Heart Cath;  Surgeon: Luis Barragan MD;  Location: [facility];  Service: Cardiovascular;  Laterality: N/A;    CARDIAC CATHETERIZATION N/A 8/12/2024    Procedure: PCI JESSICA  Stent- Coronary;  Surgeon: Luis Barragan MD;  Location: [facility];  Service: Cardiovascular;  Laterality: N/A;   [4]   Social History  Socioeconomic History    Marital status:    Tobacco Use    Smoking status: Former     Current packs/day: 0.00     Types: Cigarettes     Quit date:      Years since quittin.4    Smokeless tobacco: Never     Social Drivers of Health     Financial Resource Strain: Low Risk  (2025)    Overall Financial Resource Strain (CARDIA)     Difficulty of Paying Living Expenses: Not hard at all   Food Insecurity: No Food Insecurity (2025)    Hunger Vital Sign     Worried About Running Out of Food in the Last Year: Never true     Ran Out of Food in the Last Year: Never true   Transportation Needs: No Transportation Needs (2025)    PRAPARE - Transportation     Lack of Transportation (Medical): No     Lack of Transportation (Non-Medical): No   Intimate Partner Violence: Not At Risk (2025)    Humiliation, Afraid, Rape, and Kick questionnaire     Fear of Current or Ex-Partner: No     Emotionally Abused: No     Physically Abused: No     Sexually Abused: No   Housing Stability: Low Risk  (2025)    Housing Stability Vital Sign     Unable to Pay for Housing in the Last Year: No     Number of Times Moved in the Last Year: 0     Homeless in the Last Year: No   [5] No family history on file.  [6] aspirin, 81 mg, oral, Daily  atorvastatin, 80 mg, oral, Nightly  clopidogrel, 75 mg, oral, Daily  enoxaparin, 40 mg, subcutaneous, q24h  polyethylene glycol, 17 g, oral, Daily  [7]

## 2025-05-22 NOTE — SIGNIFICANT EVENT
Upon arrival to room focused assessment performed and WDL. Right femoral site soft and stable with no hematoma or oozing. Educated Pt on current restrictions r/t right femoral arterial puncture, she states understanding and denies needs at this time. No current complaints of dizziness/lightheadedness/pain. Paged EKG to bedside. Family members sitting in room with Pt.

## 2025-05-22 NOTE — CARE PLAN
Over the shift, the patient did make progress toward the following goals.     The clinical goals for the shift include Patient will have no further episodes of chest pain.

## 2025-05-22 NOTE — PROGRESS NOTES
"Daily Progress Note    Fallon Stevens is a 54 y.o. female on day 0 of admission presenting with Chest pain.    Subjective   Patient seen sit up in bed resting comfortably.  Denies shortness of breath or chest pain.  Patient does have history of stents from August 2024.  Patient educated on smoking sensation.  Patient is scheduled for a cardiac catheterization today with cardiology.       Objective     Physical Exam    Physical Exam  Constitutional:       Appearance: Normal appearance.   HENT:      Head: Normocephalic.      Mouth/Throat:      Mouth: Mucous membranes are moist.   Eyes:      Pupils: Pupils are equal, round, and reactive to light.   Cardiovascular:      Rate and Rhythm: Normal rate and regular rhythm.      Heart sounds: Normal heart sounds, S1 normal and S2 normal.   Pulmonary:      Effort: Pulmonary effort is normal.      Breath sounds: Normal breath sounds.   Abdominal:      General: Bowel sounds are normal.      Palpations: Abdomen is soft.   Musculoskeletal:         General: Normal range of motion.      Cervical back: Neck supple.   Skin:     General: Skin is warm.   Neurological:      Mental Status: She is alert and oriented to person, place, and time.   Psychiatric:         Mood and Affect: Mood normal.         Behavior: Behavior normal.         Last Recorded Vitals  Blood pressure 107/61, pulse 66, temperature 36.5 °C (97.7 °F), resp. rate 18, height 1.6 m (5' 2.99\"), weight 83 kg (182 lb 15.7 oz), SpO2 92%.  Intake/Output last 3 Shifts:  No intake/output data recorded.    Medications  Scheduled medications  Scheduled Medications[1]  Continuous medications  Continuous Medications[2]  PRN medications  PRN Medications[3]    Labs  CBC:   Results from last 7 days   Lab Units 05/22/25  0620 05/21/25  1842   WBC AUTO x10*3/uL 8.7 10.8   RBC AUTO x10*6/uL 3.56* 3.94*   HEMOGLOBIN g/dL 11.0* 12.3   HEMATOCRIT % 33.8* 37.8   MCV fL 95 96   MCH pg 30.9 31.2   MCHC g/dL 32.5 32.5   RDW % 13.5 13.2 "   PLATELETS AUTO x10*3/uL 284 342     CMP:    Results from last 7 days   Lab Units 05/22/25  0619 05/21/25  1842   SODIUM mmol/L 139 137   POTASSIUM mmol/L 3.9 3.9   CHLORIDE mmol/L 107 106   CO2 mmol/L 25 23   BUN mg/dL 11 14   CREATININE mg/dL 0.58 0.77   GLUCOSE mg/dL 98 128*   PROTEIN TOTAL g/dL  --  6.6   CALCIUM mg/dL 8.7 8.9   BILIRUBIN TOTAL mg/dL  --  0.3   ALK PHOS U/L  --  74   AST U/L  --  11   ALT U/L  --  8     BMP:    Results from last 7 days   Lab Units 05/22/25  0619 05/21/25  1842   SODIUM mmol/L 139 137   POTASSIUM mmol/L 3.9 3.9   CHLORIDE mmol/L 107 106   CO2 mmol/L 25 23   BUN mg/dL 11 14   CREATININE mg/dL 0.58 0.77   CALCIUM mg/dL 8.7 8.9   GLUCOSE mg/dL 98 128*     Magnesium:  Results from last 7 days   Lab Units 05/21/25  1842   MAGNESIUM mg/dL 2.07     Troponin:    Results from last 7 days   Lab Units 05/21/25  1952 05/21/25  1842   TROPHS ng/L 6 3     BNP:   Results from last 7 days   Lab Units 05/21/25  1842   BNP pg/mL 57     Lipid Panel:  Results from last 7 days   Lab Units 05/21/25  1842   INR  1.0   PROTIME seconds 10.6        Nutrition             Relevant Results  Results from last 7 days   Lab Units 05/22/25  0619 05/21/25  1842   GLUCOSE mg/dL 98 128*     Lab Results   Component Value Date    HGBA1C 5.7 (H) 08/10/2024        Assessment/Plan    Chest pain  CAD status post PTCA August 2024  Tobacco abuse    -Cardiology consult plan for heart cath today  -Cycle troponins with EKG x3  -A1c, CBC, BMP, lipid panel, PT/INR  -Vital signs every 8 hours  -SL nitro  -Morphine for pain  -Continue home medications  -Recent LVEF 65%  -ASA, statin and Plavix  -Smoking cessation with nicotine patch    Hypertension  -Resume home meds      DVTp: Lovenox    PLAN: Home    EMELIA Kaba-CNP    Plan of care was discussed extensively with patient.  Patient verbalized understanding through teach back method.  All question and concerns addressed upon examination.    Of note, this documentation  is completed using the Dragon Dictation system (voice recognition software). There may be spelling and/or grammatical errors that were not corrected prior to final submission.             [1] aspirin, 81 mg, oral, Daily  atorvastatin, 80 mg, oral, Nightly  clopidogrel, 75 mg, oral, Daily  enoxaparin, 40 mg, subcutaneous, q24h  polyethylene glycol, 17 g, oral, Daily    [2]    [3] PRN medications: acetaminophen **OR** acetaminophen **OR** acetaminophen, melatonin, morphine, nitroglycerin, ondansetron **OR** ondansetron

## 2025-05-23 ENCOUNTER — APPOINTMENT (OUTPATIENT)
Dept: CARDIOLOGY | Facility: HOSPITAL | Age: 55
End: 2025-05-23
Payer: MEDICAID

## 2025-05-23 VITALS
WEIGHT: 182.98 LBS | DIASTOLIC BLOOD PRESSURE: 55 MMHG | HEIGHT: 63 IN | BODY MASS INDEX: 32.42 KG/M2 | OXYGEN SATURATION: 93 % | RESPIRATION RATE: 16 BRPM | SYSTOLIC BLOOD PRESSURE: 109 MMHG | HEART RATE: 77 BPM | TEMPERATURE: 97.2 F

## 2025-05-23 LAB
ANION GAP SERPL CALC-SCNC: 13 MMOL/L (ref 10–20)
ATRIAL RATE: 81 BPM
BUN SERPL-MCNC: 10 MG/DL (ref 6–23)
CALCIUM SERPL-MCNC: 8.7 MG/DL (ref 8.6–10.3)
CHLORIDE SERPL-SCNC: 105 MMOL/L (ref 98–107)
CO2 SERPL-SCNC: 25 MMOL/L (ref 21–32)
CREAT SERPL-MCNC: 0.56 MG/DL (ref 0.5–1.05)
EGFRCR SERPLBLD CKD-EPI 2021: >90 ML/MIN/1.73M*2
ERYTHROCYTE [DISTWIDTH] IN BLOOD BY AUTOMATED COUNT: 13.5 % (ref 11.5–14.5)
GLUCOSE SERPL-MCNC: 113 MG/DL (ref 74–99)
HCT VFR BLD AUTO: 34.7 % (ref 36–46)
HGB BLD-MCNC: 11.5 G/DL (ref 12–16)
HOLD SPECIMEN: NORMAL
MCH RBC QN AUTO: 31.2 PG (ref 26–34)
MCHC RBC AUTO-ENTMCNC: 33.1 G/DL (ref 32–36)
MCV RBC AUTO: 94 FL (ref 80–100)
NRBC BLD-RTO: 0 /100 WBCS (ref 0–0)
P AXIS: 14 DEGREES
P OFFSET: 185 MS
P ONSET: 140 MS
PLATELET # BLD AUTO: 280 X10*3/UL (ref 150–450)
POTASSIUM SERPL-SCNC: 4 MMOL/L (ref 3.5–5.3)
PR INTERVAL: 158 MS
Q ONSET: 219 MS
QRS COUNT: 13 BEATS
QRS DURATION: 74 MS
QT INTERVAL: 390 MS
QTC CALCULATION(BAZETT): 453 MS
QTC FREDERICIA: 430 MS
R AXIS: 8 DEGREES
RBC # BLD AUTO: 3.69 X10*6/UL (ref 4–5.2)
SODIUM SERPL-SCNC: 139 MMOL/L (ref 136–145)
T AXIS: 24 DEGREES
T OFFSET: 414 MS
VENTRICULAR RATE: 81 BPM
WBC # BLD AUTO: 7.6 X10*3/UL (ref 4.4–11.3)

## 2025-05-23 PROCEDURE — 85027 COMPLETE CBC AUTOMATED: CPT | Performed by: NURSE PRACTITIONER

## 2025-05-23 PROCEDURE — 99232 SBSQ HOSP IP/OBS MODERATE 35: CPT | Performed by: NURSE PRACTITIONER

## 2025-05-23 PROCEDURE — 2500000004 HC RX 250 GENERAL PHARMACY W/ HCPCS (ALT 636 FOR OP/ED): Mod: JZ | Performed by: NURSE PRACTITIONER

## 2025-05-23 PROCEDURE — 2500000001 HC RX 250 WO HCPCS SELF ADMINISTERED DRUGS (ALT 637 FOR MEDICARE OP): Performed by: NURSE PRACTITIONER

## 2025-05-23 PROCEDURE — 36415 COLL VENOUS BLD VENIPUNCTURE: CPT | Performed by: NURSE PRACTITIONER

## 2025-05-23 PROCEDURE — 93005 ELECTROCARDIOGRAM TRACING: CPT

## 2025-05-23 PROCEDURE — 80048 BASIC METABOLIC PNL TOTAL CA: CPT | Performed by: NURSE PRACTITIONER

## 2025-05-23 PROCEDURE — 99239 HOSP IP/OBS DSCHRG MGMT >30: CPT

## 2025-05-23 RX ORDER — METOPROLOL SUCCINATE 25 MG/1
25 TABLET, EXTENDED RELEASE ORAL DAILY
Qty: 30 TABLET | Refills: 11 | Status: SHIPPED | OUTPATIENT
Start: 2025-05-23 | End: 2026-05-23

## 2025-05-23 RX ORDER — METOPROLOL SUCCINATE 25 MG/1
25 TABLET, EXTENDED RELEASE ORAL DAILY
Status: DISCONTINUED | OUTPATIENT
Start: 2025-05-23 | End: 2025-05-23 | Stop reason: HOSPADM

## 2025-05-23 RX ORDER — ISOSORBIDE MONONITRATE 30 MG/1
30 TABLET, EXTENDED RELEASE ORAL DAILY
Qty: 30 TABLET | Refills: 11 | Status: SHIPPED | OUTPATIENT
Start: 2025-05-23 | End: 2026-05-23

## 2025-05-23 RX ORDER — METOPROLOL SUCCINATE 25 MG/1
25 TABLET, EXTENDED RELEASE ORAL DAILY
Status: DISCONTINUED | OUTPATIENT
Start: 2025-05-23 | End: 2025-05-23

## 2025-05-23 RX ADMIN — MORPHINE SULFATE 4 MG: 4 INJECTION, SOLUTION INTRAMUSCULAR; INTRAVENOUS at 09:24

## 2025-05-23 RX ADMIN — ASPIRIN 81 MG: 81 TABLET, COATED ORAL at 08:16

## 2025-05-23 RX ADMIN — CLOPIDOGREL BISULFATE 75 MG: 75 TABLET, FILM COATED ORAL at 08:16

## 2025-05-23 RX ADMIN — METOPROLOL SUCCINATE 25 MG: 25 TABLET, EXTENDED RELEASE ORAL at 09:24

## 2025-05-23 ASSESSMENT — COGNITIVE AND FUNCTIONAL STATUS - GENERAL
DAILY ACTIVITIY SCORE: 24
MOBILITY SCORE: 24

## 2025-05-23 ASSESSMENT — PAIN SCALES - GENERAL: PAINLEVEL_OUTOF10: 0 - NO PAIN

## 2025-05-23 NOTE — NURSING NOTE
This nurse introduced self and role to patient, prepared and provided AVS, sat with patient, started and completed discharge education, pt verbalized understanding, without further questions or concerns, agreeable and comfortable with discharge at this time, piv removed with catheter intact, tele removed and returned to RN, pt states she has all of her belongings, pt states her ride should be here sometime after 1300, pt encouraged to let staff know when ready for transport to facility exit, discharge complete.

## 2025-05-23 NOTE — CARE PLAN
The patient's goals for the shift include      The clinical goals for the shift include Patient will remain free of chest pain throughout shift

## 2025-05-23 NOTE — PROGRESS NOTES
[facility] Heart Progress Note           Rounding NATHANAEL/Cardiologist:  Loc Dimas, APRN-CNP,   Primary Cardiologist: Dr Crook  Date:  5/23/2025  Patient:  Fallon Stevens  YOB: 1970  MRN:  [id number]   Admit Date:  5/21/2025      SUBJECTIVE:    5/23/25  Patient is awake alert and oriented x 3.  States she is feeling much better I answered all of her questions she denies chest pain or shortness of breath.  Right groin soft and intact 0 hematoma 0 bruit  EKG is normal sinus rhythm no acute changes    CONCLUSIONS:   1. Left Main Coronary Artery: This artery is normal.   2. Left Anterior Descending Artery: presents luminal irregularities and contains patent previously placed stents.   3. Mid LAD Lesion: The percent stenosis is 99%.   4. Mid LAD Lesion: pre-dilation, Resolute Erie 2.25x18 post-dilation : 0% residual stenosis. LAD: pre-procedure AUGUSTIN flow was 3(complete perfusion) and post-procedure AUGUSTIN flow was 3(complete perfusion).   5. Distal LAD Lesion: The percent stenosis is 90%.   6. Distal LAD Lesion: pre-dilation Resolute Erie 2.25x26: 0% residual stenosis: pre-procedure AUGUSTIN flow was 3(complete perfusion) and post-procedure AUGUSTIN flow was 3(complete perfusion).   7. Circumflex Coronary Artery: presents luminal irregularities.   8. Right Coronary Artery: presents luminal irregularities.         VITALS:     Vitals:    05/22/25 2047 05/22/25 2338 05/23/25 0412 05/23/25 0718   BP: 133/68 108/50 120/58 121/62   BP Location:    Left arm   Patient Position:    Lying   Pulse: 72 108 94 89   Resp:  15  15   Temp:  36 °C (96.8 °F) 36.2 °C (97.2 °F) 36.1 °C (97 °F)   TempSrc:    Temporal   SpO2:  92% 93% 95%   Weight:       Height:           Intake/Output Summary (Last 24 hours) at 5/23/2025 0921  Last data filed at 5/22/2025 1725  Gross per 24 hour   Intake --   Output 0 ml   Net 0 ml       Wt Readings from Last 4 Encounters:   05/22/25 83 kg (182 lb 15.7 oz)   08/10/24 77.1 kg (170 lb)        CURRENT HOSPITAL MEDICATIONS:   Scheduled Medications[1]  Continuous Medications[2]  Current Outpatient Medications   Medication Instructions    aspirin 81 mg, Daily    atorvastatin (LIPITOR) 80 mg, oral, Nightly    clopidogrel (PLAVIX) 75 mg, oral, Daily    isosorbide mononitrate ER (IMDUR) 30 mg, oral, Daily, Do not crush or chew.    metoprolol succinate XL (TOPROL-XL) 25 mg, oral, Daily, Do not crush or chew.    nitroglycerin (NITROSTAT) 0.4 mg, sublingual, Every 5 min PRN        PHYSICAL EXAMINATION:   GENERAL:  Well developed, well nourished, in no acute distress.  CHEST:  Symmetric and nontender.  NEURO/PSYCH:  Alert and oriented times three with approppriate behavior and responses.  NECK:  Supple, no JVD, no bruit.  LUNGS:  Clear to auscultation bilaterally, normal respiratory effort.  HEART:  Rate and rhythm regular with no evident murmur, no gallop appreciated.        There are no rubs, clicks or heaves.  EXTREMITIES:  Warm with good color, no clubbing or cyanosis.  There is no edema noted.  Right groin soft and intact 0 hematoma 0 bruit  PERIPHERAL VASCULAR:  Pulses present and equally palpable; 2+ throughout.      LAB DATA:     CBC:   Results from last 7 days   Lab Units 05/23/25 0417 05/22/25  0620 05/21/25  1842   WBC AUTO x10*3/uL 7.6 8.7 10.8   RBC AUTO x10*6/uL 3.69* 3.56* 3.94*   HEMOGLOBIN g/dL 11.5* 11.0* 12.3   HEMATOCRIT % 34.7* 33.8* 37.8   MCV fL 94 95 96   MCH pg 31.2 30.9 31.2   MCHC g/dL 33.1 32.5 32.5   RDW % 13.5 13.5 13.2   PLATELETS AUTO x10*3/uL 280 284 342     CMP:    Results from last 7 days   Lab Units 05/23/25  0417 05/22/25  0619 05/21/25  1842   SODIUM mmol/L 139 139 137   POTASSIUM mmol/L 4.0 3.9 3.9   CHLORIDE mmol/L 105 107 106   CO2 mmol/L 25 25 23   BUN mg/dL 10 11 14   CREATININE mg/dL 0.56 0.58 0.77   GLUCOSE mg/dL 113* 98 128*   PROTEIN TOTAL g/dL  --   --  6.6   CALCIUM mg/dL 8.7 8.7 8.9   BILIRUBIN TOTAL mg/dL  --   --  0.3   ALK PHOS U/L  --   --  74   AST U/L   --   --  11   ALT U/L  --   --  8     BMP:    Results from last 7 days   Lab Units 05/23/25  0417 05/22/25  0619 05/21/25  1842   SODIUM mmol/L 139 139 137   POTASSIUM mmol/L 4.0 3.9 3.9   CHLORIDE mmol/L 105 107 106   CO2 mmol/L 25 25 23   BUN mg/dL 10 11 14   CREATININE mg/dL 0.56 0.58 0.77   CALCIUM mg/dL 8.7 8.7 8.9   GLUCOSE mg/dL 113* 98 128*     Magnesium:  Results from last 7 days   Lab Units 05/21/25  1842   MAGNESIUM mg/dL 2.07     Troponin:    Results from last 7 days   Lab Units 05/21/25  1952 05/21/25  1842   TROPHS ng/L 6 3     BNP:   Results from last 7 days   Lab Units 05/21/25  1842   BNP pg/mL 57     Lipid Panel:  Results from last 7 days   Lab Units 05/22/25  0619   HDL mg/dL 28.8   CHOLESTEROL/HDL RATIO  16.6        DIAGNOSTIC TESTING:          Cardiac Catheterization Procedure   Final Result      CT angio chest for pulmonary embolism   Final Result   1. No evidence of acute pulmonary embolism.   2.        MACRO:   None.        Signed by: Timmy Galeano 5/21/2025 8:15 PM   Dictation workstation:   [id number]      XR chest 1 view   Final Result   1.  No evidence of acute cardiopulmonary process.                  MACRO:   None        Signed by: Leatah Sosa 5/21/2025 6:53 PM   Dictation workstation:   [id number]          No echocardiogram results found for the past 14 days    RADIOLOGY:     Cardiac Catheterization Procedure   Final Result      CT angio chest for pulmonary embolism   Final Result   1. No evidence of acute pulmonary embolism.   2.        MACRO:   None.        Signed by: Timmy Galeano 5/21/2025 8:15 PM   Dictation workstation:   [id number]      XR chest 1 view   Final Result   1.  No evidence of acute cardiopulmonary process.                  MACRO:   None        Signed by: Leatha Sosa 5/21/2025 6:53 PM   Dictation workstation:   [id number]          PROBLEM LIST   Problem List[3]    ASSESSMENT:   CAD status post PCI LAD  Hypertension  Dyslipidemia      PLAN:   - Okay to  discharge from cardiac standpoint  - Aspirin 81 mg daily  - Lipitor 80 mg daily  - Toprol-XL 25 mg daily  - Plavix 75 mg daily  - Imdur 30 mg daily  - follow Cardiology team in 2-week    Juan Alberto Dimas CNP  [facility]      Of note, this documentation is completed using the Dragon Dictation system (voice recognition software). There may be spelling and/or grammatical errors that were not corrected prior to final submission.    Please do not hesitate to call with questions.  Electronically signed by Loc Dimas, EMELIA-CNP, on 5/23/2025 at 9:21 AM       [1] aspirin, 81 mg, oral, Daily  atorvastatin, 80 mg, oral, Nightly  clopidogrel, 75 mg, oral, Daily  [Held by provider] enoxaparin, 40 mg, subcutaneous, q24h  metoprolol succinate XL, 25 mg, oral, Daily  polyethylene glycol, 17 g, oral, Daily    [2]    [3]   Patient Active Problem List  Diagnosis    Chest pain, unspecified    Mixed hyperlipidemia    Paroxysmal SVT (supraventricular tachycardia)    Coronary artery disease of native artery of native heart with stable angina pectoris    Anxiety    Congestive heart failure    Degenerative lumbar spinal stenosis    Cervical discogenic pain syndrome    Gastroesophageal reflux disease    Seasonal allergic rhinitis    Chest pain, unspecified type    Chest pain    Coronary artery disease with history of percutaneous transluminal angioplasty (PTCA)    Restenosis of arterial stent

## 2025-05-23 NOTE — DISCHARGE SUMMARY
Discharge Diagnosis  Chest pain    Issues Requiring Follow-Up  none    Discharge Meds     Your medication list        START taking these medications        Instructions Last Dose Given Next Dose Due   metoprolol succinate XL 25 mg 24 hr tablet  Commonly known as: Toprol-XL      Take 1 tablet (25 mg) by mouth once daily. Do not crush or chew.              CONTINUE taking these medications        Instructions Last Dose Given Next Dose Due   aspirin 81 mg EC tablet           atorvastatin 80 mg tablet  Commonly known as: Lipitor      Take 1 tablet (80 mg) by mouth once daily at bedtime.       clopidogrel 75 mg tablet  Commonly known as: Plavix      Take 1 tablet (75 mg) by mouth once daily for 365 doses.       isosorbide mononitrate ER 30 mg 24 hr tablet  Commonly known as: Imdur      Take 1 tablet (30 mg) by mouth once daily. Do not crush or chew.       nitroglycerin 0.4 mg SL tablet  Commonly known as: Nitrostat      Place 1 tablet (0.4 mg) under the tongue every 5 minutes if needed for chest pain.                 Where to Get Your Medications        [facility] [address] [facility] [address]      Phone: [phone]   atorvastatin 80 mg tablet  clopidogrel 75 mg tablet  isosorbide mononitrate ER 30 mg 24 hr tablet  metoprolol succinate XL 25 mg 24 hr tablet  nitroglycerin 0.4 mg SL tablet         Test Results Pending At Discharge  Pending Labs       Order Current Status    Extra Tubes In process    SST TOP In process            Last Vitals  Vitals:    05/22/25 2047 05/22/25 2338 05/23/25 0412 05/23/25 0718   BP: 133/68 108/50 120/58 121/62   BP Location:    Left arm   Patient Position:    Lying   Pulse: 72 108 94 89   Resp:  15  15   Temp:  36 °C (96.8 °F) 36.2 °C (97.2 °F) 36.1 °C (97 °F)   TempSrc:    Temporal   SpO2:  92% 93% 95%   Weight:       Height:           Hospital Course   Fallon Stevens is a 54 y.o. female  with a history of CAD s/p PTCA, essential hypertension presenting with substernal chest pain.  Patient states that she had sudden onset chest pain with radiation to the left shoulder.  She also felt nauseous and sweaty.  She states it feels similar to last year when she had in-stent restenosis.  Denies any shortness of breath, palpitations, leg swelling, fever, chills.  Currently she is chest pain-free as she just received pain medication.  Patient was taken to the Cath Lab and revealed 95% stenosis in the mid LAD and 90% in distal stents were successfully placed patient doing well.  Patient was seen by cardiology will continue Plavix and aspirin on discharge.  Patient ready for discharge.  On day of discharge patient dynamically stable.    Pertinent Physical Exam At Time of Discharge  Physical Exam    Outpatient Follow-Up  Future Appointments   Date Time Provider Department Center   6/4/2025 10:30 AM Adelaida Roberts, APRN-CNP [id number]         Lilia Gottlieb, APRN-CNP

## 2025-05-23 NOTE — CARE PLAN
Problem: Pain - Adult  Goal: Verbalizes/displays adequate comfort level or baseline comfort level  Outcome: Progressing     Problem: Safety - Adult  Goal: Free from fall injury  Outcome: Progressing     Problem: Discharge Planning  Goal: Discharge to home or other facility with appropriate resources  Outcome: Progressing     Problem: Chronic Conditions and Co-morbidities  Goal: Patient's chronic conditions and co-morbidity symptoms are monitored and maintained or improved  Outcome: Progressing     Problem: Nutrition  Goal: Nutrient intake appropriate for maintaining nutritional needs  Outcome: Progressing   The patient's goals for the shift include      The clinical goals for the shift include chest pain free throughout shift

## 2025-05-23 NOTE — CARE PLAN
The clinical goals for the shift include Patient will remain free of chest pain throughout shift    Problem: Pain - Adult  Goal: Verbalizes/displays adequate comfort level or baseline comfort level  Outcome: Adequate for Discharge     Problem: Safety - Adult  Goal: Free from fall injury  Outcome: Adequate for Discharge     Problem: Discharge Planning  Goal: Discharge to home or other facility with appropriate resources  Outcome: Adequate for Discharge     Problem: Chronic Conditions and Co-morbidities  Goal: Patient's chronic conditions and co-morbidity symptoms are monitored and maintained or improved  Outcome: Adequate for Discharge     Problem: Nutrition  Goal: Nutrient intake appropriate for maintaining nutritional needs  Outcome: Adequate for Discharge

## 2025-05-24 LAB
ATRIAL RATE: 67 BPM
ATRIAL RATE: 79 BPM
P AXIS: 19 DEGREES
P AXIS: 36 DEGREES
P OFFSET: 183 MS
P OFFSET: 188 MS
P ONSET: 139 MS
P ONSET: 141 MS
PR INTERVAL: 170 MS
PR INTERVAL: 170 MS
Q ONSET: 224 MS
Q ONSET: 226 MS
QRS COUNT: 11 BEATS
QRS COUNT: 13 BEATS
QRS DURATION: 70 MS
QRS DURATION: 70 MS
QT INTERVAL: 398 MS
QT INTERVAL: 412 MS
QTC CALCULATION(BAZETT): 435 MS
QTC CALCULATION(BAZETT): 456 MS
QTC FREDERICIA: 427 MS
QTC FREDERICIA: 436 MS
R AXIS: 15 DEGREES
R AXIS: 67 DEGREES
T AXIS: 13 DEGREES
T AXIS: 6 DEGREES
T OFFSET: 425 MS
T OFFSET: 430 MS
VENTRICULAR RATE: 67 BPM
VENTRICULAR RATE: 79 BPM

## 2025-05-27 ENCOUNTER — PATIENT OUTREACH (OUTPATIENT)
Dept: CARDIOLOGY | Facility: CLINIC | Age: 55
End: 2025-05-27
Payer: MEDICAID

## 2025-05-27 NOTE — PROGRESS NOTES
Discharge Facility:   [facility]  Discharge Diagnosis:  CP  Admission Date:  5/22/25  Discharge Date: 5/23/25    PCP Appointment Date:  TBD  Specialist Appointment Date:   JUN 4 2025 10:30 AM - Cardiology Hospital Discharge  [facility] - [facility]     Hospital Encounter and Summary Linked: Yes    CM attempted to call 2xs unable to leave message d/t VM full

## 2025-05-28 LAB
ATRIAL RATE: 81 BPM
P AXIS: 14 DEGREES
P OFFSET: 185 MS
P ONSET: 140 MS
PR INTERVAL: 158 MS
Q ONSET: 219 MS
QRS COUNT: 13 BEATS
QRS DURATION: 74 MS
QT INTERVAL: 390 MS
QTC CALCULATION(BAZETT): 453 MS
QTC FREDERICIA: 430 MS
R AXIS: 8 DEGREES
T AXIS: 24 DEGREES
T OFFSET: 414 MS
VENTRICULAR RATE: 81 BPM

## 2025-06-04 ENCOUNTER — APPOINTMENT (OUTPATIENT)
Dept: CARDIOLOGY | Facility: CLINIC | Age: 55
End: 2025-06-04
Payer: MEDICAID

## 2025-06-04 VITALS
HEIGHT: 63 IN | HEART RATE: 72 BPM | SYSTOLIC BLOOD PRESSURE: 90 MMHG | WEIGHT: 183.4 LBS | DIASTOLIC BLOOD PRESSURE: 48 MMHG | BODY MASS INDEX: 32.5 KG/M2

## 2025-06-04 DIAGNOSIS — Z98.61 CAD S/P PERCUTANEOUS CORONARY ANGIOPLASTY: ICD-10-CM

## 2025-06-04 DIAGNOSIS — Z09 HOSPITAL DISCHARGE FOLLOW-UP: Primary | ICD-10-CM

## 2025-06-04 DIAGNOSIS — I95.9 HYPOTENSION, UNSPECIFIED HYPOTENSION TYPE: ICD-10-CM

## 2025-06-04 DIAGNOSIS — R07.9 CHEST PAIN, UNSPECIFIED TYPE: ICD-10-CM

## 2025-06-04 DIAGNOSIS — I25.10 CAD S/P PERCUTANEOUS CORONARY ANGIOPLASTY: ICD-10-CM

## 2025-06-04 DIAGNOSIS — E78.2 MIXED HYPERLIPIDEMIA: ICD-10-CM

## 2025-06-04 PROCEDURE — 1036F TOBACCO NON-USER: CPT | Performed by: NURSE PRACTITIONER

## 2025-06-04 PROCEDURE — 99495 TRANSJ CARE MGMT MOD F2F 14D: CPT | Performed by: NURSE PRACTITIONER

## 2025-06-04 RX ORDER — METOPROLOL SUCCINATE 25 MG/1
12.5 TABLET, EXTENDED RELEASE ORAL DAILY
Start: 2025-06-04 | End: 2026-06-04

## 2025-06-04 RX ORDER — PANTOPRAZOLE SODIUM 40 MG/1
40 TABLET, DELAYED RELEASE ORAL
Qty: 30 TABLET | Refills: 6 | Status: SHIPPED | OUTPATIENT
Start: 2025-06-04 | End: 2026-06-04

## 2025-06-04 NOTE — PROGRESS NOTES
Chief Complaint:  Chief Complaint   Patient presents with    Hospital Follow-up     Patient states she is here for a recheck from the hospital.  Patient states she was at [facility]       Fallon Stevens is a 54 y.o. female that presents to the office today for transitional care management/hospital follow-up.  She was seen as a consult by Dr. Crook at [facility] 5/22/2025 as noted below.    Consult note  54-year-old woman with known coronary disease initial LAD stenting 2018 returned August last year with unstable angina found to have restenosis of proximal LAD and treated with stenting.  At that time discharged on dual antiplatelet medications and statin but did not keep any cardiology follow-up.  He had lost insurance coverage and could not make appointments.  She was able through help from friends to keep on her statin and antiplatelet agents.     Presents emergency department 5/21/2020 5 in the evening describing midsternal chest discomfort.  Pain occurred with rest.  Did say she was being compliant with her statin and antiplatelet agent.  In the emergency room showed no evidence of pulmonary embolism.  D-dimer elevated for age BNP was normal at 55 serial troponins were normal as well.     She says for the most part she has been doing fairly well.  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  Occasionally with aches and pains in the legs when she goes upstairs related to that process.  She had not had angina until yesterday.  She describes sudden onset of acute diaphoresis anter precordial tightness radiating down to both elbows associate with shortness of breath.  It also caused some anxiety.  She did not have nitroglycerin and so came to the emergency department.  Has been rendered asymptomatic currently.  She has had no palpitation, lightheadedness or syncope.  Has had no need of hospitalizations.    She underwent  left heart catherization 5/22/2025 with Dr. Gooden that showed 95% stenosis in the mid LAD, 90% stenosis in the distal LAD, mild disease of the circumflex and RCA. Patient underwent successful PCI with 2 drug-eluting stents placed to the LAD reducing those lesions down to 0% stenosis.   She was discharged on 5/23/2025 with stable condition.    Overall she states she is doing well.  She occasionally gets headaches after starting the isosorbide 30 mg daily.  States she had 1 short episode of chest discomfort that resolved with 1 nitroglycerin.  She does admit to chronic fatigue which is not new.  We have reviewed her lipid panel showing a cholesterol 479.  She states she taking her atorvastatin as prescribed at the time of this blood draw.  She states her cholesterol has always been high and at one time was on Repatha but had trouble affording this medication therefore it was discontinued.  She states she has a strong family history on her mother side with hyperlipidemia.  States her mother had CAD with a history of HLD CABG.  Her mother sister has a history of CAD, HLD and have a total of 70+ stents including lower extremity, renals and cardiac.      Testing Reviewed  5/21/2025 EKG  Normal sinus rhythm HR 67 bpm  Normal ECG    5/21/2025 CT chest  IMPRESSION:  1. No evidence of acute pulmonary embolism.    5/22/2025 lipid panel; cholesterol 479, HDL 28, non-,      CBC:   Lab Results   Component Value Date    WBC 7.6 05/23/2025    RBC 3.69 (L) 05/23/2025    HGB 11.5 (L) 05/23/2025    HCT 34.7 (L) 05/23/2025     05/23/2025        CMP:    Lab Results   Component Value Date     05/23/2025    K 4.0 05/23/2025     05/23/2025    CO2 25 05/23/2025    BUN 10 05/23/2025    CREATININE 0.56 05/23/2025    GLUCOSE 113 (H) 05/23/2025    CALCIUM 8.7 05/23/2025       Lipid Profile:    Lab Results   Component Value Date    HDL 28.8 05/22/2025       BMP:  Lab Results   Component Value Date     05/23/2025      05/22/2025     05/21/2025    K 4.0 05/23/2025    K 3.9 05/22/2025    K 3.9 05/21/2025     05/23/2025     05/22/2025     05/21/2025    CO2 25 05/23/2025    CO2 25 05/22/2025    CO2 23 05/21/2025    BUN 10 05/23/2025    BUN 11 05/22/2025    BUN 14 05/21/2025    CREATININE 0.56 05/23/2025    CREATININE 0.58 05/22/2025    CREATININE 0.77 05/21/2025       CBC:  Lab Results   Component Value Date    WBC 7.6 05/23/2025    WBC 8.7 05/22/2025    WBC 10.8 05/21/2025    RBC 3.69 (L) 05/23/2025    RBC 3.56 (L) 05/22/2025    RBC 3.94 (L) 05/21/2025    HGB 11.5 (L) 05/23/2025    HGB 11.0 (L) 05/22/2025    HGB 12.3 05/21/2025    HCT 34.7 (L) 05/23/2025    HCT 33.8 (L) 05/22/2025    HCT 37.8 05/21/2025    MCV 94 05/23/2025    MCV 95 05/22/2025    MCV 96 05/21/2025    MCH 31.2 05/23/2025    MCH 30.9 05/22/2025    MCH 31.2 05/21/2025    MCHC 33.1 05/23/2025    MCHC 32.5 05/22/2025    MCHC 32.5 05/21/2025    RDW 13.5 05/23/2025    RDW 13.5 05/22/2025    RDW 13.2 05/21/2025     05/23/2025     05/22/2025     05/21/2025       Hepatic Function Panel:    Lab Results   Component Value Date    ALKPHOS 74 05/21/2025    ALT 8 05/21/2025    AST 11 05/21/2025    PROT 6.6 05/21/2025    BILITOT 0.3 05/21/2025       HgBA1c:    Lab Results   Component Value Date    HGBA1C 5.7 (H) 08/10/2024       Magnesium:    Lab Results   Component Value Date    MG 2.07 05/21/2025       BNP:   Lab Results   Component Value Date    BNP 57 05/21/2025        PT/INR:    Lab Results   Component Value Date    PROTIME 10.6 05/21/2025    INR 1.0 05/21/2025       Cardiac Enzymes:    Lab Results   Component Value Date    TROPHS 6 05/21/2025    TROPHS 3 05/21/2025    TROPHS 6,069 (HH) 08/13/2024       Problem List[1]    Social History[2]    Medical History[3]    Current Medications[4]    Patient has no known allergies.    Family History[5]    Surgical History[6]       Review of systems  Constitutional: No weight loss,  "fever, chills, weakness.  Positive for fatigue  HEENT: No visual loss, blurred vision, double vision or yellow sclerae  Skin: No rash or itching  Cardiovascular: No chest pain, pressure or discomfort, No palpitations or edema.  Respiratory: No shortness of breath, cough or sputum  Gastrointestinal: No nausea, vomiting or diarrhea. No bloody or dark tarry stools.  Neurological: No headache, lightheadedness, dizziness, syncope.   Musculoskeletal: No muscle, back pain, joint pain or stiffness.  Hematologic: No anemia, bleeding or bruising.    BP (!) 90/48 (BP Location: Left arm, Patient Position: Sitting)   Pulse 72 Comment: apical  Ht 1.6 m (5' 3\")   Wt 83.2 kg (183 lb 6.4 oz)   BMI 32.49 kg/m²     Physical Exam  Constitutional: Well developed, awake/alert x 3, no distress.  Head/Neck: No JVD, No bruits  Respiratory/Thorax: patent airways, CTAB, normal breath sounds with good expansion.  Cardiovascular: Regular rate and rhythm, no murmurs, normal S1 and S2,   Gastrointestinal: Non distended, soft, non-tender, no rebound tenderness or guarding.  Extremities: No cyanosis, edema.   Neurological: Alert and oriented x 3. Moves extremities spontaneous with purpose.  Psychological: Appropriate mood and behavior  Skin: Warm and Dry. No lesions or rashes.     Assessment/Plan  CAD s/p angioplasty JESSICA x 2 LAD 5/22/2025.  Continue on aspirin Plavix  Due to long-term Plavix use will start on Protonix 40 mg 1 tablet daily  Hyperlipidemia; uncontrolled.  Continue on atorvastatin 80 mg daily  Start Repatha 140 mg/ml injection every 2 weeks.  Due to cost prohibitive referral has been placed to  clinical pharmacy for financial assistance with this needed medication.  Fatigue; suspect secondary to hypotension possible beta-blocker  Hypotension; 90/48.  Decrease metoprolol succinate 25 mg to 1/2 tablet daily  She declines cardiac rehab.  Follow-up in the office with Dr. Crook 4-6 weeks or sooner if needed.        Please excuse any " errors in grammar or translation related to dictation, voice recognition software was used to prepare this document.         [1]   Patient Active Problem List  Diagnosis    Chest pain, unspecified    Mixed hyperlipidemia    Paroxysmal SVT (supraventricular tachycardia)    Coronary artery disease of native artery of native heart with stable angina pectoris    Anxiety    Congestive heart failure    Degenerative lumbar spinal stenosis    Cervical discogenic pain syndrome    Gastroesophageal reflux disease    Seasonal allergic rhinitis    Chest pain, unspecified type    Chest pain    CAD S/P percutaneous coronary angioplasty    Restenosis of arterial stent    Hospital discharge follow-up   [2]   Social History  Tobacco Use    Smoking status: Former     Current packs/day: 0.00     Types: Cigarettes     Quit date:      Years since quittin.4    Smokeless tobacco: Never   Substance Use Topics    Alcohol use: Not Currently    Drug use: Not Currently   [3]   Past Medical History:  Diagnosis Date    Coronary artery disease     Hyperlipidemia    [4]   Current Outpatient Medications:     aspirin 81 mg EC tablet, Take 1 tablet (81 mg) by mouth once daily., Disp: , Rfl:     atorvastatin (Lipitor) 80 mg tablet, Take 1 tablet (80 mg) by mouth once daily at bedtime., Disp: 30 tablet, Rfl: 5    clopidogrel (Plavix) 75 mg tablet, Take 1 tablet (75 mg) by mouth once daily for 365 doses., Disp: 30 tablet, Rfl: 11    isosorbide mononitrate ER (Imdur) 30 mg 24 hr tablet, Take 1 tablet (30 mg) by mouth once daily. Do not crush or chew., Disp: 30 tablet, Rfl: 11    nitroglycerin (Nitrostat) 0.4 mg SL tablet, Place 1 tablet (0.4 mg) under the tongue every 5 minutes if needed for chest pain., Disp: 25 tablet, Rfl: 3    evolocumab (Repatha SureClick) 140 mg/mL injection, Inject 1 mL (140 mg) under the skin every 14 (fourteen) days., Disp: 6 each, Rfl: 6    metoprolol succinate XL (Toprol-XL) 25 mg 24 hr tablet, Take 0.5 tablets (12.5  mg) by mouth once daily. Do not crush or chew., Disp: , Rfl:     pantoprazole (Protonix) 40 mg EC tablet, Take 1 tablet (40 mg) by mouth once daily in the morning. Take before meals. Do not crush, chew, or split., Disp: 30 tablet, Rfl: 6  [5] No family history on file.  [6]   Past Surgical History:  Procedure Laterality Date    CARDIAC CATHETERIZATION N/A 08/12/2024    Procedure: Left Heart Cath;  Surgeon: Luis Barragan MD;  Location: [facility];  Service: Cardiovascular;  Laterality: N/A;    CARDIAC CATHETERIZATION N/A 08/12/2024    Procedure: PCI JESSICA Stent- Coronary;  Surgeon: Luis Barragan MD;  Location: [facility];  Service: Cardiovascular;  Laterality: N/A;    CARDIAC CATHETERIZATION N/A 05/22/2025    Procedure: LHC, With LV;  Surgeon: Calderon Gooden MD;  Location: [facility];  Service: Cardiovascular;  Laterality: N/A;    CARDIAC CATHETERIZATION N/A 05/22/2025    Procedure: PCI JESSICA Stent- Coronary;  Surgeon: Calderon Gooden MD;  Location: [facility];  Service: Cardiovascular;  Laterality: N/A;    CORONARY STENT PLACEMENT  05/22/2025

## 2025-06-05 ENCOUNTER — TELEPHONE (OUTPATIENT)
Dept: CARDIOLOGY | Facility: CLINIC | Age: 55
End: 2025-06-05
Payer: MEDICAID

## 2025-06-05 DIAGNOSIS — Z98.61 CAD S/P PERCUTANEOUS CORONARY ANGIOPLASTY: ICD-10-CM

## 2025-06-05 DIAGNOSIS — I25.10 CAD S/P PERCUTANEOUS CORONARY ANGIOPLASTY: ICD-10-CM

## 2025-06-05 DIAGNOSIS — E78.2 MIXED HYPERLIPIDEMIA: Primary | ICD-10-CM

## 2025-06-05 NOTE — TELEPHONE ENCOUNTER
Unable to reach patient again, unable to leave voice mail.  Called secondary number.  Spoke with patient's mother who states patient was there.    Patient notified of need for LDL to process PA.  She verbalized understanding and was provided with the phone number for [facility] lab with instructions to schedule a lab appointment if possible have fasting labs done tomorrow- 6/6/25.  Maya Carter, CMA

## 2025-06-05 NOTE — TELEPHONE ENCOUNTER
Called patient.  She was not available.  Unable to leave voice mail.  Mail box was full.  Maya Carter, CMA

## 2025-06-05 NOTE — TELEPHONE ENCOUNTER
Prior authorization started for Repatha SureClick 140 mg 1 injection every 2 weeks.  Criteria for prior authorization needs data from LDL done within 6 months.  Per [facility] inpatient lab LDL was not drawn 5/21/25 due to patient's non fasting state.  Routed to EMELIA Weir, CNP for review.  Maya Carter, CMA

## 2025-06-07 LAB — LDLC SERPL DIRECT ASSAY-MCNC: 333 MG/DL

## 2025-06-09 ENCOUNTER — PATIENT OUTREACH (OUTPATIENT)
Dept: CARDIOLOGY | Facility: CLINIC | Age: 55
End: 2025-06-09
Payer: MEDICAID

## 2025-06-09 NOTE — PROGRESS NOTES
Confirmation of at least 2 patient identifiers.    Completed telephonic follow-up with patient after recent visit with Adelaida SMITH  Spoke to patient during outreach call.    Patient reports feeling: Improved    Patient has questions or concerns about medications: No    Have all prescribed medications been filled? Yes    Patient has necessary resources to manage their care? Yes    Patient has questions or concerns? No    Next care management follow-up approximately within one month.  Care  information provided to patient.

## 2025-06-09 NOTE — TELEPHONE ENCOUNTER
LDL was drawn and resulted.  PA processed for Repatha and was completed.  Awaiting plan determination.  Maya Carter, CMA

## 2025-06-12 NOTE — TELEPHONE ENCOUNTER
Called [facility] [phone] spoke with Vania.  Per Vania PA denied due to patient not being on Atorvastatin for 90 days with LDL-c not meeting goal.  Per Vania she has no paid claims/pharmacy history prior to 5/23/25.  Vania states patient will also have to be on a trial of Ezetimibe with the Atorvastatin for 90 days and show [id number] not at goal.      Provider can call [phone] and schedule a peer 2 peer as well.  Note routed to EMELIA Weir, CNP for review.  Maya Carter, CMA

## 2025-07-07 ENCOUNTER — APPOINTMENT (OUTPATIENT)
Dept: CARDIOLOGY | Facility: CLINIC | Age: 55
End: 2025-07-07
Payer: MEDICAID

## 2025-07-09 ENCOUNTER — PATIENT OUTREACH (OUTPATIENT)
Dept: CARDIOLOGY | Facility: CLINIC | Age: 55
End: 2025-07-09
Payer: MEDICAID

## 2025-08-05 ENCOUNTER — APPOINTMENT (OUTPATIENT)
Dept: CARDIOLOGY | Facility: CLINIC | Age: 55
End: 2025-08-05
Payer: MEDICAID

## 2025-08-13 ENCOUNTER — PATIENT OUTREACH (OUTPATIENT)
Dept: PRIMARY CARE | Facility: CLINIC | Age: 55
End: 2025-08-13
Payer: MEDICAID

## 2025-08-25 ENCOUNTER — APPOINTMENT (OUTPATIENT)
Dept: CARDIOLOGY | Facility: CLINIC | Age: 55
End: 2025-08-25
Payer: MEDICAID

## 2025-08-25 VITALS
BODY MASS INDEX: 31.64 KG/M2 | DIASTOLIC BLOOD PRESSURE: 64 MMHG | HEART RATE: 74 BPM | SYSTOLIC BLOOD PRESSURE: 104 MMHG | WEIGHT: 178.6 LBS | HEIGHT: 63 IN

## 2025-08-25 DIAGNOSIS — E78.01 FAMILIAL HYPERCHOLESTEROLEMIA: ICD-10-CM

## 2025-08-25 DIAGNOSIS — Z98.61 CAD S/P PERCUTANEOUS CORONARY ANGIOPLASTY: Primary | ICD-10-CM

## 2025-08-25 DIAGNOSIS — I25.10 CAD S/P PERCUTANEOUS CORONARY ANGIOPLASTY: Primary | ICD-10-CM

## 2025-08-25 PROBLEM — R07.9 CHEST PAIN, UNSPECIFIED: Status: RESOLVED | Noted: 2024-08-10 | Resolved: 2025-08-25

## 2025-08-25 PROBLEM — T82.858A RESTENOSIS OF ARTERIAL STENT: Status: RESOLVED | Noted: 2025-05-22 | Resolved: 2025-08-25

## 2025-08-25 PROBLEM — I25.118 CORONARY ARTERY DISEASE OF NATIVE ARTERY OF NATIVE HEART WITH STABLE ANGINA PECTORIS: Status: RESOLVED | Noted: 2024-08-10 | Resolved: 2025-08-25

## 2025-08-25 PROBLEM — R07.9 CHEST PAIN: Status: RESOLVED | Noted: 2025-05-21 | Resolved: 2025-08-25

## 2025-08-25 PROBLEM — I50.9 CONGESTIVE HEART FAILURE: Status: RESOLVED | Noted: 2024-08-10 | Resolved: 2025-08-25

## 2025-08-25 PROCEDURE — 3008F BODY MASS INDEX DOCD: CPT | Performed by: INTERNAL MEDICINE

## 2025-08-25 PROCEDURE — 99214 OFFICE O/P EST MOD 30 MIN: CPT | Performed by: INTERNAL MEDICINE

## 2026-02-23 ENCOUNTER — APPOINTMENT (OUTPATIENT)
Dept: CARDIOLOGY | Facility: CLINIC | Age: 56
End: 2026-02-23
Payer: MEDICAID

## (undated) DEVICE — SHEATH, GLIDESHEATH, SLENDER, 6FR 10CM

## (undated) DEVICE — GUIDEWIRE, RUN THROUGH WIRE, 180CM

## (undated) DEVICE — GUIDEWIRE, UNIVERSAL BALANCE MID WEIGHT, 190CM, STR

## (undated) DEVICE — ACCESS KIT, MINI MAK, 4FR X 10CML, 0.018 X 40CM, SS/SS, ECHO ENHANCED 7CM NDL

## (undated) DEVICE — SHEATH, PINNACLE, W/.038 GUIDEWIRE, 10 CM,  6FR INTRODUCER, 6FR DIA, 2.5 CM DIALATOR

## (undated) DEVICE — CATHETER, DIAGNOSTIC, 5FR,  PIG-145, 110CM, 6SH ANGLED

## (undated) DEVICE — CATHETER, DIAGNOSTIC, 4 FR-JL 4

## (undated) DEVICE — NEEDLE, ANGIOGRAPHY, W/SELDINGER SHEILD HUB, 18G

## (undated) DEVICE — Device

## (undated) DEVICE — CLOSURE SYSTEM, VASCULAR, VASCADE, 5 F

## (undated) DEVICE — DEVICE, INFLATION, BASIXSKY, 30ATM BAR 20ML, MAP802 PHD, INSERT TOOL TORQUE, METAL

## (undated) DEVICE — INFLATION DEVICE, COPILOT VALVE AND 20/30 INDEFLATOR

## (undated) DEVICE — SHEATH, PINNACLE, W/.035 GUIDEWIRE, 10 CM,  4FR INTRODUCER, 4FR DIA, 2.5 CM DIALATOR

## (undated) DEVICE — CLOSURE SYSTEM, VASCULAR, VASCADE 6/7F VCS

## (undated) DEVICE — CATHETER, DIAGNOSTIC, 4FR-3DRC

## (undated) DEVICE — SHEATH, PINNACLE, W/.038 GW 10CM, 5FR INTRODUCER, 2.5 CM DIALATOR

## (undated) DEVICE — CATHETER, DIAGNOSTIC, 4FR-PIG 145 DEG-6SH,MICRO LOOP

## (undated) DEVICE — TUBING, MANIFOLD, LOW PRESSURE

## (undated) DEVICE — CATHETER, BALLOON DILATION, EUPHORA SEMICOMPLIANT 3.00  X 20 MM X 142CM

## (undated) DEVICE — CATHETER, BALLOON DILATION, EUPHORA SEMICOMPLIANT 2.0  X 12 MM X 142CM

## (undated) DEVICE — CATHETER, BALLOON, NC EUPHORA NONCOMPLIANT 2.5 X 12 X 142CM

## (undated) DEVICE — BANDAGE, QUIKCLOT, INTERVENTIONAL HEMO, W/O SLIT

## (undated) DEVICE — CATHETER, GUIDING, VISTA BRITE 6FR, XB LAD 3.5 100CM

## (undated) DEVICE — CATHETER, INFINITI DIAGNOSTIC, 5 FR 100CM 3DRC, WILLIAMS RIGHT OR NO TORQUE

## (undated) DEVICE — CATHETER, DIAGNOSTIC, JUDKINS, LEFT, 5 FR-JL 4.0

## (undated) DEVICE — CATHETER, BALLOON, NC EUPHORA NONCOMPLIANT 3.0 X 27 X 142CM